# Patient Record
Sex: FEMALE | Race: WHITE | NOT HISPANIC OR LATINO | URBAN - METROPOLITAN AREA
[De-identification: names, ages, dates, MRNs, and addresses within clinical notes are randomized per-mention and may not be internally consistent; named-entity substitution may affect disease eponyms.]

---

## 2017-02-05 ENCOUNTER — EMERGENCY (EMERGENCY)
Facility: HOSPITAL | Age: 52
LOS: 1 days | Discharge: HOME | End: 2017-02-05
Attending: EMERGENCY MEDICINE | Admitting: EMERGENCY MEDICINE
Payer: COMMERCIAL

## 2017-02-05 VITALS
HEIGHT: 62 IN | RESPIRATION RATE: 18 BRPM | WEIGHT: 139.99 LBS | HEART RATE: 100 BPM | SYSTOLIC BLOOD PRESSURE: 134 MMHG | OXYGEN SATURATION: 99 % | DIASTOLIC BLOOD PRESSURE: 77 MMHG | TEMPERATURE: 99 F

## 2017-02-05 DIAGNOSIS — R51 HEADACHE: ICD-10-CM

## 2017-02-05 LAB
ALBUMIN SERPL ELPH-MCNC: 4.6 G/DL — SIGNIFICANT CHANGE UP (ref 3.3–5)
ALP SERPL-CCNC: 144 U/L — HIGH (ref 40–120)
ALT FLD-CCNC: 53 U/L RC — HIGH (ref 10–45)
ANION GAP SERPL CALC-SCNC: 13 MMOL/L — SIGNIFICANT CHANGE UP (ref 5–17)
APPEARANCE UR: CLEAR — SIGNIFICANT CHANGE UP
AST SERPL-CCNC: 32 U/L — SIGNIFICANT CHANGE UP (ref 10–40)
BACTERIA # UR AUTO: ABNORMAL /HPF
BASOPHILS # BLD AUTO: 0 K/UL — SIGNIFICANT CHANGE UP (ref 0–0.2)
BASOPHILS NFR BLD AUTO: 0.6 % — SIGNIFICANT CHANGE UP (ref 0–2)
BILIRUB SERPL-MCNC: 0.6 MG/DL — SIGNIFICANT CHANGE UP (ref 0.2–1.2)
BILIRUB UR-MCNC: NEGATIVE — SIGNIFICANT CHANGE UP
BUN SERPL-MCNC: 13 MG/DL — SIGNIFICANT CHANGE UP (ref 7–23)
CALCIUM SERPL-MCNC: 10.1 MG/DL — SIGNIFICANT CHANGE UP (ref 8.4–10.5)
CHLORIDE SERPL-SCNC: 103 MMOL/L — SIGNIFICANT CHANGE UP (ref 96–108)
CO2 SERPL-SCNC: 26 MMOL/L — SIGNIFICANT CHANGE UP (ref 22–31)
COLOR SPEC: SIGNIFICANT CHANGE UP
CREAT SERPL-MCNC: 0.82 MG/DL — SIGNIFICANT CHANGE UP (ref 0.5–1.3)
DIFF PNL FLD: NEGATIVE — SIGNIFICANT CHANGE UP
EOSINOPHIL # BLD AUTO: 0 K/UL — SIGNIFICANT CHANGE UP (ref 0–0.5)
EOSINOPHIL NFR BLD AUTO: 0.6 % — SIGNIFICANT CHANGE UP (ref 0–6)
EPI CELLS # UR: SIGNIFICANT CHANGE UP /HPF
GLUCOSE SERPL-MCNC: 103 MG/DL — HIGH (ref 70–99)
GLUCOSE UR QL: NEGATIVE — SIGNIFICANT CHANGE UP
HCT VFR BLD CALC: 41.4 % — SIGNIFICANT CHANGE UP (ref 34.5–45)
HGB BLD-MCNC: 14 G/DL — SIGNIFICANT CHANGE UP (ref 11.5–15.5)
KETONES UR-MCNC: NEGATIVE — SIGNIFICANT CHANGE UP
LEUKOCYTE ESTERASE UR-ACNC: NEGATIVE — SIGNIFICANT CHANGE UP
LYMPHOCYTES # BLD AUTO: 1.5 K/UL — SIGNIFICANT CHANGE UP (ref 1–3.3)
LYMPHOCYTES # BLD AUTO: 22 % — SIGNIFICANT CHANGE UP (ref 13–44)
MCHC RBC-ENTMCNC: 31.8 PG — SIGNIFICANT CHANGE UP (ref 27–34)
MCHC RBC-ENTMCNC: 33.9 GM/DL — SIGNIFICANT CHANGE UP (ref 32–36)
MCV RBC AUTO: 94 FL — SIGNIFICANT CHANGE UP (ref 80–100)
MONOCYTES # BLD AUTO: 0.5 K/UL — SIGNIFICANT CHANGE UP (ref 0–0.9)
MONOCYTES NFR BLD AUTO: 6.7 % — SIGNIFICANT CHANGE UP (ref 2–14)
NEUTROPHILS # BLD AUTO: 4.8 K/UL — SIGNIFICANT CHANGE UP (ref 1.8–7.4)
NEUTROPHILS NFR BLD AUTO: 70.2 % — SIGNIFICANT CHANGE UP (ref 43–77)
NITRITE UR-MCNC: NEGATIVE — SIGNIFICANT CHANGE UP
PH UR: 5.5 — SIGNIFICANT CHANGE UP (ref 4.8–8)
PLATELET # BLD AUTO: 214 K/UL — SIGNIFICANT CHANGE UP (ref 150–400)
POTASSIUM SERPL-MCNC: 3.9 MMOL/L — SIGNIFICANT CHANGE UP (ref 3.5–5.3)
POTASSIUM SERPL-SCNC: 3.9 MMOL/L — SIGNIFICANT CHANGE UP (ref 3.5–5.3)
PROT SERPL-MCNC: 7.6 G/DL — SIGNIFICANT CHANGE UP (ref 6–8.3)
PROT UR-MCNC: NEGATIVE — SIGNIFICANT CHANGE UP
RBC # BLD: 4.41 M/UL — SIGNIFICANT CHANGE UP (ref 3.8–5.2)
RBC # FLD: 10.9 % — SIGNIFICANT CHANGE UP (ref 10.3–14.5)
RBC CASTS # UR COMP ASSIST: SIGNIFICANT CHANGE UP /HPF (ref 0–2)
SODIUM SERPL-SCNC: 142 MMOL/L — SIGNIFICANT CHANGE UP (ref 135–145)
SP GR SPEC: 1.01 — LOW (ref 1.01–1.02)
UROBILINOGEN FLD QL: NEGATIVE — SIGNIFICANT CHANGE UP
WBC # BLD: 6.8 K/UL — SIGNIFICANT CHANGE UP (ref 3.8–10.5)
WBC # FLD AUTO: 6.8 K/UL — SIGNIFICANT CHANGE UP (ref 3.8–10.5)
WBC UR QL: SIGNIFICANT CHANGE UP /HPF (ref 0–5)

## 2017-02-05 PROCEDURE — 70450 CT HEAD/BRAIN W/O DYE: CPT | Mod: 26

## 2017-02-05 PROCEDURE — 99285 EMERGENCY DEPT VISIT HI MDM: CPT

## 2017-02-05 PROCEDURE — 96375 TX/PRO/DX INJ NEW DRUG ADDON: CPT

## 2017-02-05 PROCEDURE — 99284 EMERGENCY DEPT VISIT MOD MDM: CPT | Mod: 25

## 2017-02-05 PROCEDURE — 85652 RBC SED RATE AUTOMATED: CPT

## 2017-02-05 PROCEDURE — 81001 URINALYSIS AUTO W/SCOPE: CPT

## 2017-02-05 PROCEDURE — 80053 COMPREHEN METABOLIC PANEL: CPT

## 2017-02-05 PROCEDURE — 85027 COMPLETE CBC AUTOMATED: CPT

## 2017-02-05 PROCEDURE — 70450 CT HEAD/BRAIN W/O DYE: CPT

## 2017-02-05 PROCEDURE — 96374 THER/PROPH/DIAG INJ IV PUSH: CPT

## 2017-02-05 RX ORDER — DIVALPROEX SODIUM 500 MG/1
250 TABLET, DELAYED RELEASE ORAL ONCE
Qty: 0 | Refills: 0 | Status: DISCONTINUED | OUTPATIENT
Start: 2017-02-05 | End: 2017-02-05

## 2017-02-05 RX ORDER — DIVALPROEX SODIUM 500 MG/1
250 TABLET, DELAYED RELEASE ORAL ONCE
Qty: 0 | Refills: 0 | Status: COMPLETED | OUTPATIENT
Start: 2017-02-05 | End: 2017-02-05

## 2017-02-05 RX ORDER — SODIUM CHLORIDE 9 MG/ML
1000 INJECTION INTRAMUSCULAR; INTRAVENOUS; SUBCUTANEOUS ONCE
Qty: 0 | Refills: 0 | Status: COMPLETED | OUTPATIENT
Start: 2017-02-05 | End: 2017-02-05

## 2017-02-05 RX ORDER — METOCLOPRAMIDE HCL 10 MG
10 TABLET ORAL ONCE
Qty: 0 | Refills: 0 | Status: COMPLETED | OUTPATIENT
Start: 2017-02-05 | End: 2017-02-05

## 2017-02-05 RX ORDER — ACETAMINOPHEN 500 MG
1000 TABLET ORAL ONCE
Qty: 0 | Refills: 0 | Status: COMPLETED | OUTPATIENT
Start: 2017-02-05 | End: 2017-02-05

## 2017-02-05 RX ORDER — DIAZEPAM 5 MG
5 TABLET ORAL ONCE
Qty: 0 | Refills: 0 | Status: DISCONTINUED | OUTPATIENT
Start: 2017-02-05 | End: 2017-02-05

## 2017-02-05 RX ADMIN — SODIUM CHLORIDE 1000 MILLILITER(S): 9 INJECTION INTRAMUSCULAR; INTRAVENOUS; SUBCUTANEOUS at 19:38

## 2017-02-05 RX ADMIN — DIVALPROEX SODIUM 250 MILLIGRAM(S): 500 TABLET, DELAYED RELEASE ORAL at 22:43

## 2017-02-05 RX ADMIN — Medication 10 MILLIGRAM(S): at 19:37

## 2017-02-05 RX ADMIN — Medication 5 MILLIGRAM(S): at 22:16

## 2017-02-05 RX ADMIN — SODIUM CHLORIDE 1000 MILLILITER(S): 9 INJECTION INTRAMUSCULAR; INTRAVENOUS; SUBCUTANEOUS at 22:17

## 2017-02-05 RX ADMIN — Medication 400 MILLIGRAM(S): at 19:38

## 2017-02-05 RX ADMIN — Medication 1000 MILLIGRAM(S): at 21:57

## 2017-02-05 NOTE — ED ADULT NURSE NOTE - OBJECTIVE STATEMENT
This 51 female in ED with c/o has had right temporal pressure X1 week, waxing and waning but became worse than ever today. Pt denies visual changes, photophobia, numbness or tingling or nausea or vomiting. Pt has been taking Tylenol without relief.

## 2017-02-05 NOTE — ED PROVIDER NOTE - OBJECTIVE STATEMENT
51 y.o. female no PMHx p/w headache. Patient describes symptoms as not painful but more of pressure like sensation, located focally over the right temple. Symptoms have been present for 1 week, came on gradually, constant and has no alleviated. She has tried taking Motrin and Tylenol but they provided minimal symptomatic relief. She does report that applying pressure to the area seems to alleviate pressure somewhat. Denies fevers, chills, vision changes, weakness, numbness/tingling.

## 2017-02-05 NOTE — ED PROVIDER NOTE - ATTENDING CONTRIBUTION TO CARE
52 y/o f with pmhx denies, perimenopausal presents for head pressure on right side temporal area that began yesterday, worse today. no associated weakness ./ numbness. no change in mentation. no fever no chills. no change in vision no change in hearing. no incontinence of urine or bowel. no back pain or neck pain. no abd pain no chest pain no heart palp. first time with headache. gradual onset. describes as pressure and not a sharp pain 5/10.   Gen. no acute distress, Non toxic   HEENT: EOMI, mmm, no tenderness over temporal artery. supple neck. full range of motion. no murmur of neck  Lungs: CTAB/L no C/ W /R   CVS: S1S2   Abd; Soft non tender, non distended   Ext: no edema   Neuro AAOx3 non focal clear speech,  neg Burdinksi / Kernig, steady gait. 50 y/o f with pmhx denies, perimenopausal presents for head pressure on right side temporal area that began yesterday, worse today. no associated weakness ./ numbness. no change in mentation. no fever no chills. no change in vision no change in hearing. no incontinence of urine or bowel. no back pain or neck pain. no abd pain no chest pain no heart palp. first time with headache. gradual onset. describes as pressure and not a sharp pain 5/10.   Gen. no acute distress, Non toxic   HEENT: EOMI, mmm, no tenderness over temporal artery. supple neck. full range of motion. no murmur of neck pupils 3 mm b/l equally round and reactive to light. no pain with eye movement. no skin changes. no distended veins.   Lungs: CTAB/L no C/ W /R   CVS: S1S2   Abd; Soft non tender, non distended   Ext: no edema   Neuro AAOx3 non focal clear speech,  neg Burdinksi / Kernig, steady gait.  muscle strength 5/5 x 4 extremities. no sens deficits. no pronator drift.

## 2017-02-05 NOTE — ED PROVIDER NOTE - PROGRESS NOTE DETAILS
Patient reports minimal improvement in symptoms after IV tylenol and Reglan. Will give Valium PO and reassess. Arvind Jenkins PA-C. ATTD: Dr. Boyer - patient feels much better. wants to go home.refusing steroids. was evaluated by neuro. no further imaging rec at this time. dc home to follow with Dr. Calero and neuro as outpt. return if worsens or persists.

## 2017-02-06 VITALS
HEART RATE: 87 BPM | OXYGEN SATURATION: 99 % | DIASTOLIC BLOOD PRESSURE: 77 MMHG | SYSTOLIC BLOOD PRESSURE: 130 MMHG | RESPIRATION RATE: 16 BRPM

## 2018-02-21 ENCOUNTER — INPATIENT (INPATIENT)
Facility: HOSPITAL | Age: 53
LOS: 1 days | Discharge: HOME | DRG: 204 | End: 2018-02-23
Attending: STUDENT IN AN ORGANIZED HEALTH CARE EDUCATION/TRAINING PROGRAM | Admitting: STUDENT IN AN ORGANIZED HEALTH CARE EDUCATION/TRAINING PROGRAM
Payer: COMMERCIAL

## 2018-02-21 VITALS
WEIGHT: 141.98 LBS | OXYGEN SATURATION: 97 % | RESPIRATION RATE: 18 BRPM | DIASTOLIC BLOOD PRESSURE: 90 MMHG | HEIGHT: 62 IN | TEMPERATURE: 99 F | HEART RATE: 114 BPM | SYSTOLIC BLOOD PRESSURE: 136 MMHG

## 2018-02-21 DIAGNOSIS — Z29.9 ENCOUNTER FOR PROPHYLACTIC MEASURES, UNSPECIFIED: ICD-10-CM

## 2018-02-21 DIAGNOSIS — Z98.890 OTHER SPECIFIED POSTPROCEDURAL STATES: Chronic | ICD-10-CM

## 2018-02-21 DIAGNOSIS — R06.09 OTHER FORMS OF DYSPNEA: ICD-10-CM

## 2018-02-21 DIAGNOSIS — E78.5 HYPERLIPIDEMIA, UNSPECIFIED: ICD-10-CM

## 2018-02-21 DIAGNOSIS — F41.9 ANXIETY DISORDER, UNSPECIFIED: ICD-10-CM

## 2018-02-21 DIAGNOSIS — E04.1 NONTOXIC SINGLE THYROID NODULE: ICD-10-CM

## 2018-02-21 LAB
ALBUMIN SERPL ELPH-MCNC: 4.8 G/DL — SIGNIFICANT CHANGE UP (ref 3.3–5)
ALP SERPL-CCNC: 129 U/L — HIGH (ref 40–120)
ALT FLD-CCNC: 27 U/L RC — SIGNIFICANT CHANGE UP (ref 10–45)
ANION GAP SERPL CALC-SCNC: 12 MMOL/L — SIGNIFICANT CHANGE UP (ref 5–17)
APPEARANCE UR: ABNORMAL
APTT BLD: 28.4 SEC — SIGNIFICANT CHANGE UP (ref 27.5–37.4)
AST SERPL-CCNC: 19 U/L — SIGNIFICANT CHANGE UP (ref 10–40)
BASOPHILS # BLD AUTO: 0 K/UL — SIGNIFICANT CHANGE UP (ref 0–0.2)
BASOPHILS NFR BLD AUTO: 0.7 % — SIGNIFICANT CHANGE UP (ref 0–2)
BILIRUB SERPL-MCNC: 1.1 MG/DL — SIGNIFICANT CHANGE UP (ref 0.2–1.2)
BILIRUB UR-MCNC: NEGATIVE — SIGNIFICANT CHANGE UP
BUN SERPL-MCNC: 16 MG/DL — SIGNIFICANT CHANGE UP (ref 7–23)
CALCIUM SERPL-MCNC: 10 MG/DL — SIGNIFICANT CHANGE UP (ref 8.4–10.5)
CHLORIDE SERPL-SCNC: 103 MMOL/L — SIGNIFICANT CHANGE UP (ref 96–108)
CK MB CFR SERPL CALC: 1.4 NG/ML — SIGNIFICANT CHANGE UP (ref 0–3.8)
CO2 SERPL-SCNC: 27 MMOL/L — SIGNIFICANT CHANGE UP (ref 22–31)
COLOR SPEC: YELLOW — SIGNIFICANT CHANGE UP
CREAT SERPL-MCNC: 0.91 MG/DL — SIGNIFICANT CHANGE UP (ref 0.5–1.3)
D DIMER BLD IA.RAPID-MCNC: <150 NG/ML DDU — SIGNIFICANT CHANGE UP
DIFF PNL FLD: NEGATIVE — SIGNIFICANT CHANGE UP
EOSINOPHIL # BLD AUTO: 0 K/UL — SIGNIFICANT CHANGE UP (ref 0–0.5)
EOSINOPHIL NFR BLD AUTO: 0.4 % — SIGNIFICANT CHANGE UP (ref 0–6)
GAS PNL BLDV: SIGNIFICANT CHANGE UP
GLUCOSE SERPL-MCNC: 102 MG/DL — HIGH (ref 70–99)
GLUCOSE UR QL: NEGATIVE — SIGNIFICANT CHANGE UP
HCG SERPL-ACNC: 2.8 MIU/ML — LOW (ref 5–24)
HCT VFR BLD CALC: 45.6 % — HIGH (ref 34.5–45)
HGB BLD-MCNC: 15.1 G/DL — SIGNIFICANT CHANGE UP (ref 11.5–15.5)
INR BLD: 1.07 RATIO — SIGNIFICANT CHANGE UP (ref 0.88–1.16)
KETONES UR-MCNC: NEGATIVE — SIGNIFICANT CHANGE UP
LEUKOCYTE ESTERASE UR-ACNC: ABNORMAL
LYMPHOCYTES # BLD AUTO: 1.3 K/UL — SIGNIFICANT CHANGE UP (ref 1–3.3)
LYMPHOCYTES # BLD AUTO: 20.9 % — SIGNIFICANT CHANGE UP (ref 13–44)
MCHC RBC-ENTMCNC: 31.4 PG — SIGNIFICANT CHANGE UP (ref 27–34)
MCHC RBC-ENTMCNC: 33.2 GM/DL — SIGNIFICANT CHANGE UP (ref 32–36)
MCV RBC AUTO: 94.4 FL — SIGNIFICANT CHANGE UP (ref 80–100)
MONOCYTES # BLD AUTO: 0.4 K/UL — SIGNIFICANT CHANGE UP (ref 0–0.9)
MONOCYTES NFR BLD AUTO: 6.1 % — SIGNIFICANT CHANGE UP (ref 2–14)
NEUTROPHILS # BLD AUTO: 4.5 K/UL — SIGNIFICANT CHANGE UP (ref 1.8–7.4)
NEUTROPHILS NFR BLD AUTO: 72 % — SIGNIFICANT CHANGE UP (ref 43–77)
NITRITE UR-MCNC: NEGATIVE — SIGNIFICANT CHANGE UP
PH UR: 5.5 — SIGNIFICANT CHANGE UP (ref 5–8)
PLATELET # BLD AUTO: 209 K/UL — SIGNIFICANT CHANGE UP (ref 150–400)
POTASSIUM SERPL-MCNC: 3.9 MMOL/L — SIGNIFICANT CHANGE UP (ref 3.5–5.3)
POTASSIUM SERPL-SCNC: 3.9 MMOL/L — SIGNIFICANT CHANGE UP (ref 3.5–5.3)
PROT SERPL-MCNC: 8 G/DL — SIGNIFICANT CHANGE UP (ref 6–8.3)
PROT UR-MCNC: NEGATIVE — SIGNIFICANT CHANGE UP
PROTHROM AB SERPL-ACNC: 11.6 SEC — SIGNIFICANT CHANGE UP (ref 9.8–12.7)
RBC # BLD: 4.83 M/UL — SIGNIFICANT CHANGE UP (ref 3.8–5.2)
RBC # FLD: 10.8 % — SIGNIFICANT CHANGE UP (ref 10.3–14.5)
SODIUM SERPL-SCNC: 142 MMOL/L — SIGNIFICANT CHANGE UP (ref 135–145)
SP GR SPEC: 1.01 — SIGNIFICANT CHANGE UP (ref 1.01–1.02)
TROPONIN T SERPL-MCNC: <0.01 NG/ML — SIGNIFICANT CHANGE UP (ref 0–0.06)
TROPONIN T SERPL-MCNC: <0.01 NG/ML — SIGNIFICANT CHANGE UP (ref 0–0.06)
TSH SERPL-MCNC: 0.52 UIU/ML — SIGNIFICANT CHANGE UP (ref 0.27–4.2)
UROBILINOGEN FLD QL: NEGATIVE — SIGNIFICANT CHANGE UP
WBC # BLD: 6.2 K/UL — SIGNIFICANT CHANGE UP (ref 3.8–10.5)
WBC # FLD AUTO: 6.2 K/UL — SIGNIFICANT CHANGE UP (ref 3.8–10.5)

## 2018-02-21 PROCEDURE — 93010 ELECTROCARDIOGRAM REPORT: CPT

## 2018-02-21 PROCEDURE — 99223 1ST HOSP IP/OBS HIGH 75: CPT

## 2018-02-21 PROCEDURE — 71046 X-RAY EXAM CHEST 2 VIEWS: CPT | Mod: 26

## 2018-02-21 PROCEDURE — 99285 EMERGENCY DEPT VISIT HI MDM: CPT | Mod: 25

## 2018-02-21 PROCEDURE — 71275 CT ANGIOGRAPHY CHEST: CPT | Mod: 26

## 2018-02-21 RX ORDER — ENOXAPARIN SODIUM 100 MG/ML
40 INJECTION SUBCUTANEOUS EVERY 24 HOURS
Qty: 0 | Refills: 0 | Status: DISCONTINUED | OUTPATIENT
Start: 2018-02-21 | End: 2018-02-23

## 2018-02-21 RX ORDER — HYDROCORTISONE 20 MG
100 TABLET ORAL ONCE
Qty: 0 | Refills: 0 | Status: COMPLETED | OUTPATIENT
Start: 2018-02-21 | End: 2018-02-21

## 2018-02-21 RX ORDER — ASPIRIN/CALCIUM CARB/MAGNESIUM 324 MG
324 TABLET ORAL ONCE
Qty: 0 | Refills: 0 | Status: COMPLETED | OUTPATIENT
Start: 2018-02-21 | End: 2018-02-21

## 2018-02-21 RX ORDER — DIPHENHYDRAMINE HCL 50 MG
25 CAPSULE ORAL ONCE
Qty: 0 | Refills: 0 | Status: COMPLETED | OUTPATIENT
Start: 2018-02-21 | End: 2018-02-21

## 2018-02-21 RX ADMIN — Medication 324 MILLIGRAM(S): at 14:54

## 2018-02-21 RX ADMIN — Medication 25 MILLIGRAM(S): at 17:38

## 2018-02-21 RX ADMIN — Medication 100 MILLIGRAM(S): at 14:55

## 2018-02-21 NOTE — H&P ADULT - NSHPREVIEWOFSYSTEMS_GEN_ALL_CORE
CONSTITUTIONAL: No weakness, fevers or chills  EYES/ENT: No visual changes;  No dysphagia  NECK: No pain or stiffness  RESPIRATORY: dry cough, no sputum; +IBRAHIM  CARDIOVASCULAR: occasional chest discomfort left rib area.  +palpitation  EXTREMITIES: no le edema, cyanosis, clubbing  MUSCULOSKELETAL: no joint pain, swelling  GASTROINTESTINAL: No abdominal or epigastric pain. No nausea, vomiting, or hematemesis; No diarrhea or constipation. No melena or hematochezia.  BACK: No back pain  GENITOURINARY: No dysuria, frequency or hematuria; has hot flushes  NEUROLOGICAL: occasional left arm tingling sensation  SKIN: No itching, burning, rashes, or lesions   PSYCH: hx of anxiety  All other review of systems is negative unless indicated above.

## 2018-02-21 NOTE — ED PROVIDER NOTE - MUSCULOSKELETAL NEGATIVE STATEMENT, MLM
no back pain, no gout, no musculoskeletal pain, no neck pain, and no weakness. No calf pain or leg swelling

## 2018-02-21 NOTE — H&P ADULT - NSHPPHYSICALEXAM_GEN_ALL_CORE
PHYSICAL EXAM:  Vital Signs Last 24 Hrs  T(C): 36.9 (02-21-18 @ 21:47)  T(F): 98.4 (02-21-18 @ 21:47), Max: 99.4 (02-21-18 @ 12:42)  HR: 112 (02-21-18 @ 21:47) (100 - 118)  BP: 124/85 (02-21-18 @ 21:47)  BP(mean): --  RR: 19 (02-21-18 @ 21:47) (18 - 20)  SpO2: 98% (02-21-18 @ 21:47) (96% - 98%)  Wt(kg): --    Constitutional: NAD, awake and alert  EYES: EOMI  ENT:  Normal Hearing, no tonsillar exudates   Neck: Soft and supple, No JVD  Lungs: Breath sounds are clear bilaterally, No wheezing, rales or rhonchi  Heart: S1 and S2, regular rate and rhythm, no Murmurs, gallops or rubs  Abdomen: Bowel Sounds present, soft, nontender, nondistended, no guarding, no rebound  Extremities: No cyanosis or clubbing; warm to touch  Vascular: 2+ peripheral pulses lower ex  Neurological: A/O x 3, no focal deficits  Musculoskeletal: 5/5 strength b/l upper and lower extremities  Skin: No rashes  Psych: no depression or anhedonia  HEME: no bruises, no nose bleeds

## 2018-02-21 NOTE — H&P ADULT - ASSESSMENT
51 yo female wtih PMH of HLD, thyroid goiter, thyroid nodule, anxiety, not on medication, presents here with SOB.

## 2018-02-21 NOTE — H&P ADULT - HISTORY OF PRESENT ILLNESS
51 yo female wt PMH of HLD, thyroid goiter, thyroid nodule, anxiety, not on medication, presents here with SOB.  Patient states that for last 1.5 weeks she has been having SOB.  She feels that symptoms only occurs with activities, especially with walking.  She can walk from one room to another without any symptoms, but can't complete on block without having dyspnea.  Patient has occasional left rib pain, that worsens when lying on left side.  She also notices that her heart races at times.  Denies LE edema, orthopnea, PND.  She uses 3 pillows at night because otherwise nose gets stuffy.  Denies any dizziness, blurry vision.  She has dry cough, no sputum, no fever, no runny nose.  She denies any recent travel or sick contact.  She went to her PMD today, had EKG done which showed increased heart rate and therefore sent here for suspicion for PE.      Of note, both patient and her  states that she has been under lots of stress at home due to her teenage daughter.  Patient has hx of anxiety, was on lexapro in the past, but not taking medication for last few years.

## 2018-02-21 NOTE — ED PROVIDER NOTE - MEDICAL DECISION MAKING DETAILS
Attending MD Anne: 52F with no known pmh presenting with IBRAHIM x 1 week, tachy. PE a strong consideration especially in light of inferolateral TWI on EKG. Plan to proceed to CTA chest. DDx includes ACS, will obtain cardiac biomarkers, maintain on tele for now

## 2018-02-21 NOTE — ED PROVIDER NOTE - PROGRESS NOTE DETAILS
Jose Schultz MD: Patient admitted to [facility] status post negative PE study on CTa due to dyspnea on exertion and ekg changes

## 2018-02-21 NOTE — ED PROVIDER NOTE - OBJECTIVE STATEMENT
52 yr female with no pmhx, complains of  dyspnea on exertion for 1 week, does note a dry cough, with no leg swelling, no calf pain. History of PE or DVT . No recent travel or surgery was evaluated by patients PCP was tachy cardiac and was sent to rule out PE. 52 yr female with no pmhx, complains of  dyspnea on exertion for 1 week, does note a dry cough, with no leg swelling, no calf pain. History of PE or DVT . No recent travel or surgery was evaluated by patients PCP was tachy cardiac and was sent to rule out PE.       PMD Dr. Marcelina Gimenez

## 2018-02-21 NOTE — ED ADULT NURSE NOTE - OBJECTIVE STATEMENT
52yr male presented to the ED c/o sob.  Pt has no prior medical hx.  Pt reports sob for the past week and half, went to PMD today and was told to come to ED to r/u PE.  Pt presents a&ox4 reporting sob for the past week on exertion, reports feeling better when not walking and lyes down, denies chest pain, no n/v//d, no abdominal pain, no fever/chills/cough, lung sounds clear bilaterally, no dizziness or light headedness, skin warm dry & intact.

## 2018-02-21 NOTE — H&P ADULT - NSHPLABSRESULTS_GEN_ALL_CORE
Labs personally reviewed:                          15.1   6.2   )-----------( 209      ( 2018 14:15 )             45.6         142  |  103  |  16  ----------------------------<  102<H>  3.9   |  27  |  0.91    Ca    10.0      2018 14:15    TPro  8.0  /  Alb  4.8  /  TBili  1.1  /  DBili  x   /  AST  19  /  ALT  27  /  AlkPhos  129<H>  02-    CARDIAC MARKERS ( 2018 20:38 )  x     / <0.01 ng/mL / x     / x     / x      CARDIAC MARKERS ( 2018 14:15 )  x     / <0.01 ng/mL / x     / x     / 1.4 ng/mL      LIVER FUNCTIONS - ( 2018 14:15 )  Alb: 4.8 g/dL / Pro: 8.0 g/dL / ALK PHOS: 129 U/L / ALT: 27 U/L RC / AST: 19 U/L / GGT: x           PT/INR - ( 2018 14:15 )   PT: 11.6 sec;   INR: 1.07 ratio         PTT - ( 2018 14:15 )  PTT:28.4 sec  Urinalysis Basic - ( 2018 14:37 )    Color: Yellow / Appearance: SL Turbid / S.015 / pH: x  Gluc: x / Ketone: Negative  / Bili: Negative / Urobili: Negative   Blood: x / Protein: Negative / Nitrite: Negative   Leuk Esterase: Small / RBC: 3-5 /HPF / WBC 3-5 /HPF   Sq Epi: x / Non Sq Epi: OCC /HPF / Bacteria: Few /HPF      CAPILLARY BLOOD GLUCOSE          Imaging:  CXR personally reviewed: no focal opacity  CTA chest: no pulmonary embolism; evaluation of segmental and subsegmental branches are limited    EKG personally reviewed: nsr, TWI leads II, III, AVF Labs personally reviewed:                          15.1   6.2   )-----------( 209      ( 2018 14:15 )             45.6         142  |  103  |  16  ----------------------------<  102<H>  3.9   |  27  |  0.91    Ca    10.0      2018 14:15    TPro  8.0  /  Alb  4.8  /  TBili  1.1  /  DBili  x   /  AST  19  /  ALT  27  /  AlkPhos  129<H>  -    CARDIAC MARKERS ( 2018 20:38 )  x     / <0.01 ng/mL / x     / x     / x      CARDIAC MARKERS ( 2018 14:15 )  x     / <0.01 ng/mL / x     / x     / 1.4 ng/mL      LIVER FUNCTIONS - ( 2018 14:15 )  Alb: 4.8 g/dL / Pro: 8.0 g/dL / ALK PHOS: 129 U/L / ALT: 27 U/L RC / AST: 19 U/L / GGT: x           PT/INR - ( 2018 14:15 )   PT: 11.6 sec;   INR: 1.07 ratio         PTT - ( 2018 14:15 )  PTT:28.4 sec  Urinalysis Basic - ( 2018 14:37 )    Color: Yellow / Appearance: SL Turbid / S.015 / pH: x  Gluc: x / Ketone: Negative  / Bili: Negative / Urobili: Negative   Blood: x / Protein: Negative / Nitrite: Negative   Leuk Esterase: Small / RBC: 3-5 /HPF / WBC 3-5 /HPF   Sq Epi: x / Non Sq Epi: OCC /HPF / Bacteria: Few /HPF      CAPILLARY BLOOD GLUCOSE        Tele:  sinus rhythm HR 90s - 140s  Imaging:  CXR personally reviewed: no focal opacity  CTA chest: no pulmonary embolism; evaluation of segmental and subsegmental branches are limited    EKG personally reviewed: nsr, TWI leads II, III, AVF

## 2018-02-21 NOTE — H&P ADULT - PROBLEM SELECTOR PLAN 1
Patient with dyspnea on exertion, no orthopnea, no PND, no LE edema  unlikely CHF   no PE on CTA chest and d-dimer <150  no cough, no fever or leukocytosis, CXR negative for opacity, unlikely to be pneumonia  troponin x 2 negative; unlikely to be ACS, however given EKG changes, patient will benefit from stress test; will defer to cardiology  for now will get echocardiogram and monitor in telemetry   ?possible anxiety related  ?thyroid dysfunction, hx of thyroid nodule  will check TSH, T4, T3

## 2018-02-21 NOTE — PROVIDER CONTACT NOTE (OTHER) - ASSESSMENT
Pt is sinus tach, -120s on telemetry. Pt has no C/O pain/discomfort. Pt states she feels palpitations in chest and is cardiac enzymes negative X2. Patient states she has a nodule on her thyroid and possible goiter. VS: ; /85. Pt ias afebrile.

## 2018-02-22 ENCOUNTER — TRANSCRIPTION ENCOUNTER (OUTPATIENT)
Age: 53
End: 2018-02-22

## 2018-02-22 DIAGNOSIS — R94.31 ABNORMAL ELECTROCARDIOGRAM [ECG] [EKG]: ICD-10-CM

## 2018-02-22 LAB
ALBUMIN SERPL ELPH-MCNC: 3.9 G/DL — SIGNIFICANT CHANGE UP (ref 3.3–5)
ALP SERPL-CCNC: 112 U/L — SIGNIFICANT CHANGE UP (ref 40–120)
ALT FLD-CCNC: 20 U/L — SIGNIFICANT CHANGE UP (ref 10–45)
ANION GAP SERPL CALC-SCNC: 14 MMOL/L — SIGNIFICANT CHANGE UP (ref 5–17)
AST SERPL-CCNC: 19 U/L — SIGNIFICANT CHANGE UP (ref 10–40)
BASOPHILS # BLD AUTO: 0.01 K/UL — SIGNIFICANT CHANGE UP (ref 0–0.2)
BASOPHILS NFR BLD AUTO: 0.1 % — SIGNIFICANT CHANGE UP (ref 0–2)
BILIRUB SERPL-MCNC: 0.8 MG/DL — SIGNIFICANT CHANGE UP (ref 0.2–1.2)
BUN SERPL-MCNC: 20 MG/DL — SIGNIFICANT CHANGE UP (ref 7–23)
CALCIUM SERPL-MCNC: 10 MG/DL — SIGNIFICANT CHANGE UP (ref 8.4–10.5)
CHLORIDE SERPL-SCNC: 102 MMOL/L — SIGNIFICANT CHANGE UP (ref 96–108)
CHOLEST SERPL-MCNC: 207 MG/DL — HIGH (ref 10–199)
CK MB CFR SERPL CALC: 1.3 NG/ML — SIGNIFICANT CHANGE UP (ref 0–3.8)
CK SERPL-CCNC: 46 U/L — SIGNIFICANT CHANGE UP (ref 25–170)
CO2 SERPL-SCNC: 25 MMOL/L — SIGNIFICANT CHANGE UP (ref 22–31)
CREAT SERPL-MCNC: 0.9 MG/DL — SIGNIFICANT CHANGE UP (ref 0.5–1.3)
EOSINOPHIL # BLD AUTO: 0.01 K/UL — SIGNIFICANT CHANGE UP (ref 0–0.5)
EOSINOPHIL NFR BLD AUTO: 0.1 % — SIGNIFICANT CHANGE UP (ref 0–6)
GLUCOSE SERPL-MCNC: 128 MG/DL — HIGH (ref 70–99)
HCT VFR BLD CALC: 40.4 % — SIGNIFICANT CHANGE UP (ref 34.5–45)
HDLC SERPL-MCNC: 61 MG/DL — SIGNIFICANT CHANGE UP (ref 40–125)
HGB BLD-MCNC: 13.5 G/DL — SIGNIFICANT CHANGE UP (ref 11.5–15.5)
IMM GRANULOCYTES NFR BLD AUTO: 0.1 % — SIGNIFICANT CHANGE UP (ref 0–1.5)
LIPID PNL WITH DIRECT LDL SERPL: 134 MG/DL — HIGH
LYMPHOCYTES # BLD AUTO: 2.09 K/UL — SIGNIFICANT CHANGE UP (ref 1–3.3)
LYMPHOCYTES # BLD AUTO: 22 % — SIGNIFICANT CHANGE UP (ref 13–44)
MAGNESIUM SERPL-MCNC: 2.3 MG/DL — SIGNIFICANT CHANGE UP (ref 1.6–2.6)
MCHC RBC-ENTMCNC: 31 PG — SIGNIFICANT CHANGE UP (ref 27–34)
MCHC RBC-ENTMCNC: 33.4 GM/DL — SIGNIFICANT CHANGE UP (ref 32–36)
MCV RBC AUTO: 92.7 FL — SIGNIFICANT CHANGE UP (ref 80–100)
MONOCYTES # BLD AUTO: 0.87 K/UL — SIGNIFICANT CHANGE UP (ref 0–0.9)
MONOCYTES NFR BLD AUTO: 9.2 % — SIGNIFICANT CHANGE UP (ref 2–14)
NEUTROPHILS # BLD AUTO: 6.5 K/UL — SIGNIFICANT CHANGE UP (ref 1.8–7.4)
NEUTROPHILS NFR BLD AUTO: 68.5 % — SIGNIFICANT CHANGE UP (ref 43–77)
NT-PROBNP SERPL-SCNC: 136 PG/ML — SIGNIFICANT CHANGE UP (ref 0–300)
PHOSPHATE SERPL-MCNC: 4.1 MG/DL — SIGNIFICANT CHANGE UP (ref 2.5–4.5)
PLATELET # BLD AUTO: 228 K/UL — SIGNIFICANT CHANGE UP (ref 150–400)
POTASSIUM SERPL-MCNC: 3.8 MMOL/L — SIGNIFICANT CHANGE UP (ref 3.5–5.3)
POTASSIUM SERPL-SCNC: 3.8 MMOL/L — SIGNIFICANT CHANGE UP (ref 3.5–5.3)
PROT SERPL-MCNC: 6.9 G/DL — SIGNIFICANT CHANGE UP (ref 6–8.3)
RBC # BLD: 4.36 M/UL — SIGNIFICANT CHANGE UP (ref 3.8–5.2)
RBC # FLD: 12.4 % — SIGNIFICANT CHANGE UP (ref 10.3–14.5)
SODIUM SERPL-SCNC: 141 MMOL/L — SIGNIFICANT CHANGE UP (ref 135–145)
T3 SERPL-MCNC: 72 NG/DL — LOW (ref 80–200)
T4 AB SER-ACNC: 7.5 UG/DL — SIGNIFICANT CHANGE UP (ref 4.6–12)
TOTAL CHOLESTEROL/HDL RATIO MEASUREMENT: 3.4 RATIO — SIGNIFICANT CHANGE UP (ref 3.3–7.1)
TRIGL SERPL-MCNC: 58 MG/DL — SIGNIFICANT CHANGE UP (ref 10–149)
TROPONIN T SERPL-MCNC: <0.01 NG/ML — SIGNIFICANT CHANGE UP (ref 0–0.06)
TSH SERPL-MCNC: 0.52 UIU/ML — SIGNIFICANT CHANGE UP (ref 0.27–4.2)
WBC # BLD: 9.49 K/UL — SIGNIFICANT CHANGE UP (ref 3.8–10.5)
WBC # FLD AUTO: 9.49 K/UL — SIGNIFICANT CHANGE UP (ref 3.8–10.5)

## 2018-02-22 PROCEDURE — 93306 TTE W/DOPPLER COMPLETE: CPT | Mod: 26

## 2018-02-22 RX ORDER — PREGABALIN 225 MG/1
0 CAPSULE ORAL
Qty: 0 | Refills: 0 | COMMUNITY

## 2018-02-22 RX ORDER — ALPRAZOLAM 0.25 MG
0.25 TABLET ORAL DAILY
Qty: 0 | Refills: 0 | Status: DISCONTINUED | OUTPATIENT
Start: 2018-02-22 | End: 2018-02-23

## 2018-02-22 NOTE — DISCHARGE NOTE ADULT - CARE PROVIDER_API CALL
Cheryle Gimenez), Internal Medicine  [address] [place] [address]  Phone: [phone]  Fax: [phone]    Raúl Fierro), Cardiovascular Disease; Internal Medicine  [address] [place] [address]  Phone: [phone]  Fax: [phone]

## 2018-02-22 NOTE — DISCHARGE NOTE ADULT - OTHER SIGNIFICANT FINDINGS
Attending DC note:  51 yo female wtih PMH of HLD, thyroid goiter, thyroid nodule, anxiety, not on medication, presents here with SOB. ACS ruled out. no events on tele. stress echo wnl. TTE wnl. symtoms resolved. patient discharged home.

## 2018-02-22 NOTE — CONSULT NOTE ADULT - ASSESSMENT
52 F h/o HLD, goiter, thyroid nodule, anxiety, a/w IBRAHIM. 52 F h/o HLD, goiter, thyroid nodule, anxiety, a/w IBRAHIM and abnormal EKG.

## 2018-02-22 NOTE — CONSULT NOTE ADULT - SUBJECTIVE AND OBJECTIVE BOX
[facility] Cardiology Consult  _________________________    CC: shortness of breath.    HPI:  52 F h/o HLD, goiter, thyroid nodule, anxiety, who was admitted with over 1 week of shortness of breath/ IBRAHIM with walking 1 block. She has occasional L rib pain, that worsens when lying on L side. No central chest pain or pressure.  + periodic heart racing but no dizziness/lightheadedness or syncope. She denies LE edema, orthopnea, or PND. She reports higher than normal levels of stress at home.     PCP Dr. Cheryle Gimenez.    PAST MEDICAL & SURGICAL HISTORY:  Anxiety  Thyroid nodule  Thyroid goiter  Hyperlipidemia  H/O umbilical hernia repair  Delivered by  section  H/O laparoscopy      MEDICATIONS  (STANDING):  enoxaparin Injectable 40 milliGRAM(s) SubCutaneous every 24 hours    MEDICATIONS  (PRN):  ALPRAZolam 0.25 milliGRAM(s) Oral daily PRN anxiety      Allergies    erythromycin (Unknown)  ibuprofen (Unknown)  iv dye (Hives; Urticaria)  penicillin (Unknown)    Intolerances        Social Histroy: Tobacco- , ETOH-, Illicit Drugs-    T(C): 36.8 (18 @ 04:44), Max: 37.4 (18 @ 12:42)  HR: 81 (18 @ 04:44) (81 - 118)  BP: 114/73 (18 @ 04:44) (110/66 - 136/90)  RR: 18 (18 @ 04:44) (18 - 20)  SpO2: 99% (18 @ 04:44) (96% - 99%)  I&O's Summary      Review of Systems:  Constitutional: [ ] Fever [ ] Chills [ ] Fatigue [ ] Weight change   HEENT: [ ] Blurred vision [ ] Eye Pain [ ] Headache [ ] Runny nose [ ] Sore Throat   Respiratory: [ ] Cough [ ] Wheezing [x ] Shortness of breath  Cardiovascular: [ ] Chest Pain [ ] Palpitations [x ] IBRAHIM [ ] PND [ ] Orthopnea  Gastrointestinal: [ ] Abdominal Pain [ ] Diarrhea [ ] Constipation [ ] Hemorrhoids [ ] Nausea [ ] Vomiting  Genitourinary: [ ] Nocturia [ ] Dysuria [ ] Incontinence  Extremities: [ ] Swelling [ ] Joint Pain  Neurologic: [ ] Focal deficit [ ] Paresthesias [ ] Syncope  Lymphatic: [ ] Swelling [ ] Lymphadenopathy   Skin: [ ] Rash [ ] Ecchymoses [ ] Wounds [ ] Lesions  Psychiatry: [ ] Depression [ ] Suicidal/Homicidal Ideation [ ] Anxiety [ ] Sleep Disturbances  [x ] 10 point review of systems is otherwise negative except as mentioned above            [ ]Unable to obtain    PHYSICAL EXAM:  GENERAL: Alert, NAD  NECK: Supple, No JVD, No carotid bruit.  CHEST/LUNG: Clear to auscultation bilaterally; No wheezes, rales, or rhonchi  HEART: S1 S2 normal, RRR,  No murmurs, rubs, or gallops  ABDOMEN: Soft, Nontender, Nondistended; Bowel sounds present  EXTREMITIES:  No LE edema.      LABS:                        15.1   6.2   )-----------( 209      ( 2018 14:15 )             45.6         142  |  103  |  16  ----------------------------<  102<H>  3.9   |  27  |  0.91    Ca    10.0      2018 14:15    TPro  8.0  /  Alb  4.8  /  TBili  1.1  /  DBili  x   /  AST  19  /  ALT  27  /  AlkPhos  129<H>  -    PT/INR - ( 2018 14:15 )   PT: 11.6 sec;   INR: 1.07 ratio         PTT - ( 2018 14:15 )  PTT:28.4 sec  CARDIAC MARKERS ( 2018 03:03 )  x     / <0.01 ng/mL / 46 U/L / x     / 1.3 ng/mL  CARDIAC MARKERS ( 2018 20:38 )  x     / <0.01 ng/mL / x     / x     / x      CARDIAC MARKERS ( 2018 14:15 )  x     / <0.01 ng/mL / x     / x     / 1.4 ng/mL      Urinalysis Basic - ( 2018 14:37 )    Color: Yellow / Appearance: SL Turbid / S.015 / pH: x  Gluc: x / Ketone: Negative  / Bili: Negative / Urobili: Negative   Blood: x / Protein: Negative / Nitrite: Negative   Leuk Esterase: Small / RBC: 3-5 /HPF / WBC 3-5 /HPF   Sq Epi: x / Non Sq Epi: OCC /HPF / Bacteria: Few /HPF        MEDICATIONS  (STANDING):  enoxaparin Injectable 40 milliGRAM(s) SubCutaneous every 24 hours    MEDICATIONS  (PRN):  ALPRAZolam 0.25 milliGRAM(s) Oral daily PRN anxiety      RADIOLOGY & ADDITIONAL TESTS:    Cardiology testing:  EKG:    Echo:  < from: Transthoracic Echocardiogram (16 @ 13:40) >  Patient name: Tiffany Almeida  YOB: 1965   Age: 50 (F)   MR#: [id number]  Study Date: 2016  Location: O/PSonographer: Edwina Becerra ARIAN  Study quality: Technically good  Referring Physician: CHERYLE GIMENEZ MD  Blood Pressure:130/76 mmHg  Height: 5ft 2in  Weight: 138 lb  BSA: 1.6 m2  ------------------------------------------------------------------------  PROCEDURE: Transthoracic echocardiogram with 2-D, M-Mode  and complete spectral and color flow Doppler.  INDICATION: Abnormal electrocardiogram (ECG) (EKG) (R94.31)  ------------------------------------------------------------------------  Dimensions:    Normal Values:  LA:     3.1    2.0 - 4.0 cm  Ao:     2.5    2.0 - 3.8 cm  SEPTUM: 0.7    0.6 - 1.2 cm  PWT:    1.0    0.6 - 1.1 cm  LVIDd:  4.7    3.0 - 5.6 cm  LVIDs:  3.2    1.8 - 4.0 cm  Derived variables:  LVMI: 81 g/m2  RWT: 0.42  Fractional short: 32 %  EF (Teicholtz): 60 %  ------------------------------------------------------------------------  Observations:  Mitral Valve: Normal mitral valve. Minimal mitral  regurgitation.  Aortic Valve/Aorta: Normal trileaflet aortic valve.  Aortic Root: 2.5 cm.  Left Atrium: LA volume index = 13 cc/m2.  Left Ventricle: Normal left ventricular systolic function.  No segmental wall motion abnormalities. Normal left  ventricular internal dimensions and wall thicknesses.  Right Heart: Normal right atrium. Normal right ventricular  size and function. Normal tricuspid valve. Normal pulmonic  valve.  Pericardium/Pleura: Normal pericardium with no pericardial  effusion.  Hemodynamic: Estimated right atrial pressure is 8 mm Hg.  ------------------------------------------------------------------------  Conclusions:  1. Normal left ventricular internal dimensions and wall  thicknesses.  2. Normal left ventricular systolic function. No segmental  wall motion abnormalities.  ------------------------------------------------------------------------  Confirmed on  2016 - 14:52:20 by Titus Rice M.D.  ------------------------------------------------------------------------    < end of copied text >    Telemetry: [facility] Cardiology Consult  _________________________    CC: shortness of breath.    HPI:  52 F h/o HLD, goiter, thyroid nodule, anxiety, who was admitted with over 1 week of shortness of breath/ IBRAHIM with walking 1 block. She has occasional L rib pain, that worsens when lying on L side. No central chest pain or pressure.  + periodic heart racing but no dizziness/lightheadedness or syncope. She denies LE edema, orthopnea, or PND. She reports higher than normal levels of stress at home.     PCP Dr. Cheryle Gimenez.    PAST MEDICAL & SURGICAL HISTORY:  Anxiety  Thyroid nodule  Thyroid goiter  Hyperlipidemia  H/O umbilical hernia repair  Delivered by  section  H/O laparoscopy      MEDICATIONS  (STANDING):  enoxaparin Injectable 40 milliGRAM(s) SubCutaneous every 24 hours    MEDICATIONS  (PRN):  ALPRAZolam 0.25 milliGRAM(s) Oral daily PRN anxiety      Allergies    erythromycin (Unknown)  ibuprofen (Unknown)  iv dye (Hives; Urticaria)  penicillin (Unknown)    Intolerances        Social Histroy: Tobacco- , ETOH-, Illicit Drugs-    T(C): 36.8 (18 @ 04:44), Max: 37.4 (18 @ 12:42)  HR: 81 (18 @ 04:44) (81 - 118)  BP: 114/73 (18 @ 04:44) (110/66 - 136/90)  RR: 18 (18 @ 04:44) (18 - 20)  SpO2: 99% (18 @ 04:44) (96% - 99%)  I&O's Summary      Review of Systems:  Constitutional: [ ] Fever [ ] Chills [ ] Fatigue [ ] Weight change   HEENT: [ ] Blurred vision [ ] Eye Pain [ ] Headache [ ] Runny nose [ ] Sore Throat   Respiratory: [ ] Cough [ ] Wheezing [x ] Shortness of breath  Cardiovascular: [ ] Chest Pain [ ] Palpitations [x ] IBRAHIM [ ] PND [ ] Orthopnea  Gastrointestinal: [ ] Abdominal Pain [ ] Diarrhea [ ] Constipation [ ] Hemorrhoids [ ] Nausea [ ] Vomiting  Genitourinary: [ ] Nocturia [ ] Dysuria [ ] Incontinence  Extremities: [ ] Swelling [ ] Joint Pain  Neurologic: [ ] Focal deficit [ ] Paresthesias [ ] Syncope  Lymphatic: [ ] Swelling [ ] Lymphadenopathy   Skin: [ ] Rash [ ] Ecchymoses [ ] Wounds [ ] Lesions  Psychiatry: [ ] Depression [ ] Suicidal/Homicidal Ideation [ ] Anxiety [ ] Sleep Disturbances  [x ] 10 point review of systems is otherwise negative except as mentioned above            [ ]Unable to obtain    PHYSICAL EXAM:  GENERAL: Alert, NAD  NECK: Supple, No JVD, No carotid bruit.  CHEST/LUNG: Clear to auscultation bilaterally; No wheezes, rales, or rhonchi  HEART: S1 S2 normal, RRR,  No murmurs, rubs, or gallops  ABDOMEN: Soft, Nontender, Nondistended; Bowel sounds present  EXTREMITIES:  No LE edema.      LABS:                        15.1   6.2   )-----------( 209      ( 2018 14:15 )             45.6         142  |  103  |  16  ----------------------------<  102<H>  3.9   |  27  |  0.91    Ca    10.0      2018 14:15    TPro  8.0  /  Alb  4.8  /  TBili  1.1  /  DBili  x   /  AST  19  /  ALT  27  /  AlkPhos  129<H>  -    PT/INR - ( 2018 14:15 )   PT: 11.6 sec;   INR: 1.07 ratio         PTT - ( 2018 14:15 )  PTT:28.4 sec  CARDIAC MARKERS ( 2018 03:03 )  x     / <0.01 ng/mL / 46 U/L / x     / 1.3 ng/mL  CARDIAC MARKERS ( 2018 20:38 )  x     / <0.01 ng/mL / x     / x     / x      CARDIAC MARKERS ( 2018 14:15 )  x     / <0.01 ng/mL / x     / x     / 1.4 ng/mL      Urinalysis Basic - ( 2018 14:37 )    Color: Yellow / Appearance: SL Turbid / S.015 / pH: x  Gluc: x / Ketone: Negative  / Bili: Negative / Urobili: Negative   Blood: x / Protein: Negative / Nitrite: Negative   Leuk Esterase: Small / RBC: 3-5 /HPF / WBC 3-5 /HPF   Sq Epi: x / Non Sq Epi: OCC /HPF / Bacteria: Few /HPF        MEDICATIONS  (STANDING):  enoxaparin Injectable 40 milliGRAM(s) SubCutaneous every 24 hours    MEDICATIONS  (PRN):  ALPRAZolam 0.25 milliGRAM(s) Oral daily PRN anxiety      RADIOLOGY & ADDITIONAL TESTS:    Cardiology testing:  EKG: sinus tach, LAD, diffuse nonspecific T wave abnormality.    Echo:  < from: Transthoracic Echocardiogram (16 @ 13:40) >  Patient name: Tiffany Almeida  YOB: 1965   Age: 50 (F)   MR#: [id number]  Study Date: 2016  Location: O/PSonographer: Edwina Becerra [facility]  Study quality: Technically good  Referring Physician: CHERYLE GIMENEZ MD  Blood Pressure:130/76 mmHg  Height: 5ft 2in  Weight: 138 lb  BSA: 1.6 m2  ------------------------------------------------------------------------  PROCEDURE: Transthoracic echocardiogram with 2-D, M-Mode  and complete spectral and color flow Doppler.  INDICATION: Abnormal electrocardiogram (ECG) (EKG) (R94.31)  ------------------------------------------------------------------------  Dimensions:    Normal Values:  LA:     3.1    2.0 - 4.0 cm  Ao:     2.5    2.0 - 3.8 cm  SEPTUM: 0.7    0.6 - 1.2 cm  PWT:    1.0    0.6 - 1.1 cm  LVIDd:  4.7    3.0 - 5.6 cm  LVIDs:  3.2    1.8 - 4.0 cm  Derived variables:  LVMI: 81 g/m2  RWT: 0.42  Fractional short: 32 %  EF (Christiane): 60 %  ------------------------------------------------------------------------  Observations:  Mitral Valve: Normal mitral valve. Minimal mitral  regurgitation.  Aortic Valve/Aorta: Normal trileaflet aortic valve.  Aortic Root: 2.5 cm.  Left Atrium: LA volume index = 13 cc/m2.  Left Ventricle: Normal left ventricular systolic function.  No segmental wall motion abnormalities. Normal left  ventricular internal dimensions and wall thicknesses.  Right Heart: Normal right atrium. Normal right ventricular  size and function. Normal tricuspid valve. Normal pulmonic  valve.  Pericardium/Pleura: Normal pericardium with no pericardial  effusion.  Hemodynamic: Estimated right atrial pressure is 8 mm Hg.  ------------------------------------------------------------------------  Conclusions:  1. Normal left ventricular internal dimensions and wall  thicknesses.  2. Normal left ventricular systolic function. No segmental  wall motion abnormalities.  ------------------------------------------------------------------------  Confirmed on  2016 - 14:52:20 by Titus Rice M.D.  ------------------------------------------------------------------------    < end of copied text >    Telemetry: sinus/sinus tach 's. [facility] Cardiology Consult  _________________________    CC: shortness of breath.    HPI:  52 F h/o HLD, goiter, thyroid nodule, anxiety, who was admitted with over 1 week of shortness of breath/ IBRAHIM with walking 1 block. She has occasional L rib pain, that worsens when lying on L side. No central chest pain or pressure.  + periodic heart racing but no dizziness/lightheadedness or syncope. She denies LE edema, orthopnea, or PND. She reports higher than normal levels of stress at home. Also currently menopausal.    PCP Dr. Cheryle Gimenez.    PAST MEDICAL & SURGICAL HISTORY:  Anxiety  Thyroid nodule  Thyroid goiter  Hyperlipidemia  H/O umbilical hernia repair  Delivered by  section  H/O laparoscopy      MEDICATIONS  (STANDING):  enoxaparin Injectable 40 milliGRAM(s) SubCutaneous every 24 hours    MEDICATIONS  (PRN):  ALPRAZolam 0.25 milliGRAM(s) Oral daily PRN anxiety      Allergies    erythromycin (Unknown)  ibuprofen (Unknown)  iv dye (Hives; Urticaria)  penicillin (Unknown)    Intolerances        Social Histroy: Tobacco- , ETOH-, Illicit Drugs-    T(C): 36.8 (18 @ 04:44), Max: 37.4 (18 @ 12:42)  HR: 81 (18 @ 04:44) (81 - 118)  BP: 114/73 (18 @ 04:44) (110/66 - 136/90)  RR: 18 (18 @ 04:44) (18 - 20)  SpO2: 99% (18 @ 04:44) (96% - 99%)  I&O's Summary      Review of Systems:  Constitutional: [ ] Fever [ ] Chills [ ] Fatigue [ ] Weight change   HEENT: [ ] Blurred vision [ ] Eye Pain [ ] Headache [ ] Runny nose [ ] Sore Throat   Respiratory: [ ] Cough [ ] Wheezing [x ] Shortness of breath  Cardiovascular: [ ] Chest Pain [ ] Palpitations [x ] IBRAHIM [ ] PND [ ] Orthopnea  Gastrointestinal: [ ] Abdominal Pain [ ] Diarrhea [ ] Constipation [ ] Hemorrhoids [ ] Nausea [ ] Vomiting  Genitourinary: [ ] Nocturia [ ] Dysuria [ ] Incontinence  Extremities: [ ] Swelling [ ] Joint Pain  Neurologic: [ ] Focal deficit [ ] Paresthesias [ ] Syncope  Lymphatic: [ ] Swelling [ ] Lymphadenopathy   Skin: [ ] Rash [ ] Ecchymoses [ ] Wounds [ ] Lesions  Psychiatry: [ ] Depression [ ] Suicidal/Homicidal Ideation [ ] Anxiety [ ] Sleep Disturbances  [x ] 10 point review of systems is otherwise negative except as mentioned above            [ ]Unable to obtain    PHYSICAL EXAM:  GENERAL: Alert, NAD  NECK: Supple, No JVD, No carotid bruit.  CHEST/LUNG: Clear to auscultation bilaterally; No wheezes, rales, or rhonchi  HEART: S1 S2 normal, tachycardiac and regular,  No murmurs, rubs, or gallops  ABDOMEN: Soft, Nontender, Nondistended; Bowel sounds present  EXTREMITIES:  No LE edema.      LABS:                        15.1   6.2   )-----------( 209      ( 2018 14:15 )             45.6         142  |  103  |  16  ----------------------------<  102<H>  3.9   |  27  |  0.91    Ca    10.0      2018 14:15    TPro  8.0  /  Alb  4.8  /  TBili  1.1  /  DBili  x   /  AST  19  /  ALT  27  /  AlkPhos  129<H>  -    PT/INR - ( 2018 14:15 )   PT: 11.6 sec;   INR: 1.07 ratio         PTT - ( 2018 14:15 )  PTT:28.4 sec  CARDIAC MARKERS ( 2018 03:03 )  x     / <0.01 ng/mL / 46 U/L / x     / 1.3 ng/mL  CARDIAC MARKERS ( 2018 20:38 )  x     / <0.01 ng/mL / x     / x     / x      CARDIAC MARKERS ( 2018 14:15 )  x     / <0.01 ng/mL / x     / x     / 1.4 ng/mL      Urinalysis Basic - ( 2018 14:37 )    Color: Yellow / Appearance: SL Turbid / S.015 / pH: x  Gluc: x / Ketone: Negative  / Bili: Negative / Urobili: Negative   Blood: x / Protein: Negative / Nitrite: Negative   Leuk Esterase: Small / RBC: 3-5 /HPF / WBC 3-5 /HPF   Sq Epi: x / Non Sq Epi: OCC /HPF / Bacteria: Few /HPF        MEDICATIONS  (STANDING):  enoxaparin Injectable 40 milliGRAM(s) SubCutaneous every 24 hours    MEDICATIONS  (PRN):  ALPRAZolam 0.25 milliGRAM(s) Oral daily PRN anxiety      RADIOLOGY & ADDITIONAL TESTS:    Cardiology testing:  EKG: sinus tach, LAD, diffuse nonspecific T wave abnormality.    Echo:  < from: Transthoracic Echocardiogram (16 @ 13:40) >  Patient name: Tiffany Almeida  YOB: 1965   Age: 50 (F)   MR#: [id number]  Study Date: 2016  Location: O/PSonographer: Edwina Becerra [facility]  Study quality: Technically good  Referring Physician: CHERYLE GIMENEZ MD  Blood Pressure:130/76 mmHg  Height: 5ft 2in  Weight: 138 lb  BSA: 1.6 m2  ------------------------------------------------------------------------  PROCEDURE: Transthoracic echocardiogram with 2-D, M-Mode  and complete spectral and color flow Doppler.  INDICATION: Abnormal electrocardiogram (ECG) (EKG) (R94.31)  ------------------------------------------------------------------------  Dimensions:    Normal Values:  LA:     3.1    2.0 - 4.0 cm  Ao:     2.5    2.0 - 3.8 cm  SEPTUM: 0.7    0.6 - 1.2 cm  PWT:    1.0    0.6 - 1.1 cm  LVIDd:  4.7    3.0 - 5.6 cm  LVIDs:  3.2    1.8 - 4.0 cm  Derived variables:  LVMI: 81 g/m2  RWT: 0.42  Fractional short: 32 %  EF (Lisatz): 60 %  ------------------------------------------------------------------------  Observations:  Mitral Valve: Normal mitral valve. Minimal mitral  regurgitation.  Aortic Valve/Aorta: Normal trileaflet aortic valve.  Aortic Root: 2.5 cm.  Left Atrium: LA volume index = 13 cc/m2.  Left Ventricle: Normal left ventricular systolic function.  No segmental wall motion abnormalities. Normal left  ventricular internal dimensions and wall thicknesses.  Right Heart: Normal right atrium. Normal right ventricular  size and function. Normal tricuspid valve. Normal pulmonic  valve.  Pericardium/Pleura: Normal pericardium with no pericardial  effusion.  Hemodynamic: Estimated right atrial pressure is 8 mm Hg.  ------------------------------------------------------------------------  Conclusions:  1. Normal left ventricular internal dimensions and wall  thicknesses.  2. Normal left ventricular systolic function. No segmental  wall motion abnormalities.  ------------------------------------------------------------------------  Confirmed on  2016 - 14:52:20 by Titus Rice M.D.  ------------------------------------------------------------------------    < end of copied text >    Telemetry: sinus/sinus tach 's.

## 2018-02-22 NOTE — DISCHARGE NOTE ADULT - MEDICATION SUMMARY - MEDICATIONS TO TAKE
I will START or STAY ON the medications listed below when I get home from the hospital:    Vitamin D3 1000 intl units oral tablet  -- 1 tab(s) by mouth once a day  -- Indication: For Supplement

## 2018-02-22 NOTE — DISCHARGE NOTE ADULT - HOSPITAL COURSE
60 y/o female with past medical history of HLD, thyroid goiter, thyroid nodule, anxiety ( not on any medications)  presents to the hospital  with c/o increase SOB and rapid HR.   States symptoms occur only with activities and walking.  She also c/o left rib pain that worsens when lying on left side.  Patient seen by her PMD, EKG with increase HR , concern for PE and was sent to ER   CTA chest was negative for PE   Echocardiogram  Nuclear stress   Patient stable for discharge and to follow up with cardiology and her PMD 62 y/o female PMH HLD, thyroid goiter, thyroid nodule, anxiety ( not on any medications)  presents to the hospital  with c/o increase SOB and rapid HR.   States symptoms occur only with activities and walking.  She also c/o left rib pain that worsens when lying on left side.  Patient seen by her PMD, EKG with increase HR , concern for PE and was sent to ER. CTA chest was negative for PE  Echocardiogram shows EF 65%.  Stress echocardiogram with no inducible ischemia.  Strain imaging performed post recovery.  GLS -13.3% (reduced).  Cardiac MRI may assist in assessing for cardiomyopathy.  .....................................................  Patient stable for discharge home. Follow up with cardiology and her PMD 60 y/o female PMH HLD, thyroid goiter, thyroid nodule, anxiety ( not on any medications)  presents to the hospital  with c/o increase SOB and rapid HR.   States symptoms occur only with activities and walking.  She also c/o left rib pain that worsens when lying on left side.  Patient seen by her PMD, EKG with increase HR , concern for PE and was sent to ER. CTA chest was negative for PE  Echocardiogram shows EF 65%.  Stress echocardiogram with no inducible ischemia.  Patient stable for discharge home. Follow up with Dr Fierro in 2 weeks and her PMD.

## 2018-02-22 NOTE — DISCHARGE NOTE ADULT - CARE PLAN
Principal Discharge DX:	Dyspnea on exertion  Secondary Diagnosis:	Abnormal EKG Principal Discharge DX:	Dyspnea on exertion  Goal:	To remain symptom free  Assessment and plan of treatment:	Follow up with your PMD in one week. Call to make an appointment.   If you develop chest pain or shortness of breath, please go to your nearest emergency room and contact your physician.  Secondary Diagnosis:	Abnormal EKG  Assessment and plan of treatment:	Follow up with your cardiologist in one week, call to make an appointment.

## 2018-02-22 NOTE — DISCHARGE NOTE ADULT - ADDITIONAL INSTRUCTIONS
please call to schedule an appointment with your PMD within 1 week after discharge   please call to schedule an appointment with cardiology within 3-5 days after discharge

## 2018-02-22 NOTE — PROGRESS NOTE ADULT - SUBJECTIVE AND OBJECTIVE BOX
Patient is a 52y old  Female who presents with a chief complaint of shortness of breath (2018 23:26)      SUBJECTIVE / OVERNIGHT EVENTS:    Patient seen and examined. co chest fluttering, feels like heart skipping beat. co dyspnea for 2 weeks. denies family hx of heart dz. denies smoking.      Vital Signs Last 24 Hrs  T(C): 36.8 (2018 04:44), Max: 37.4 (2018 12:42)  T(F): 98.2 (2018 04:44), Max: 99.4 (2018 12:42)  HR: 81 (2018 04:44) (81 - 118)  BP: 114/73 (2018 04:44) (110/66 - 136/90)  BP(mean): --  RR: 18 (2018 04:44) (18 - 20)  SpO2: 99% (2018 04:44) (96% - 99%)  I&O's Summary    2018 07:01  -  2018 10:51  --------------------------------------------------------  IN: 250 mL / OUT: 0 mL / NET: 250 mL        PE:  GENERAL: NAD, AAOx3  HEAD:  Atraumatic, Normocephalic  EYES: EOMI, PERRLA, conjunctiva and sclera clear  NECK: Supple, No JVD  CHEST/LUNG: CTABL, No wheeze  HEART: Regular rate and rhythm; NTTP, no murmur  ABDOMEN: Soft, Nontender, Nondistended; Bowel sounds present  EXTREMITIES:  2+ Peripheral Pulses, No clubbing, cyanosis, or edema  SKIN: No rashes or lesions  NEURO: No focal deficits    LABS:                        13.5   9.49  )-----------( 228      ( 2018 07:41 )             40.4         141  |  102  |  20  ----------------------------<  128<H>  3.8   |  25  |  0.90    Ca    10.0      2018 07:41  Phos  4.1       Mg     2.3         TPro  6.9  /  Alb  3.9  /  TBili  0.8  /  DBili  x   /  AST  19  /  ALT  20  /  AlkPhos  112  -    PT/INR - ( 2018 14:15 )   PT: 11.6 sec;   INR: 1.07 ratio         PTT - ( 2018 14:15 )  PTT:28.4 sec  CAPILLARY BLOOD GLUCOSE        CARDIAC MARKERS ( 2018 03:03 )  x     / <0.01 ng/mL / 46 U/L / x     / 1.3 ng/mL  CARDIAC MARKERS ( 2018 20:38 )  x     / <0.01 ng/mL / x     / x     / x      CARDIAC MARKERS ( 2018 14:15 )  x     / <0.01 ng/mL / x     / x     / 1.4 ng/mL      Urinalysis Basic - ( 2018 14:37 )    Color: Yellow / Appearance: SL Turbid / S.015 / pH: x  Gluc: x / Ketone: Negative  / Bili: Negative / Urobili: Negative   Blood: x / Protein: Negative / Nitrite: Negative   Leuk Esterase: Small / RBC: 3-5 /HPF / WBC 3-5 /HPF   Sq Epi: x / Non Sq Epi: OCC /HPF / Bacteria: Few /HPF        RADIOLOGY & ADDITIONAL TESTS:    Imaging Personally Reviewed:  [x] YES  [ ] NO    Consultant(s) Notes Reviewed:  [x] YES  [ ] NO    MEDICATIONS  (STANDING):  enoxaparin Injectable 40 milliGRAM(s) SubCutaneous every 24 hours    MEDICATIONS  (PRN):  ALPRAZolam 0.25 milliGRAM(s) Oral daily PRN anxiety      Care Discussed with Consultants/Other Providers [x] YES  [ ] NO    HEALTH ISSUES - PROBLEM Dx:  Abnormal EKG: Abnormal EKG  Prophylactic measure: Prophylactic measure  Hyperlipidemia: Hyperlipidemia  Thyroid nodule: Thyroid nodule  Anxiety: Anxiety  Dyspnea on exertion: Dyspnea on exertion

## 2018-02-22 NOTE — DISCHARGE NOTE ADULT - PATIENT PORTAL LINK FT
You can access the [facility], offered by [facility], by registering with the following website: [email or web address]

## 2018-02-22 NOTE — CONSULT NOTE ADULT - PROBLEM SELECTOR RECOMMENDATION 2
-  - will plan for ischemic eval with exercise nuclear stress test.   - plan d/w pt. Questions answered.    Raúl Fierro M.D., [facility]  [phone]     A total of 80 minutes of face-to-face time was spent with the patient during this encounter -over half of that time was spent in counseling and coordination of care.

## 2018-02-22 NOTE — CONSULT NOTE ADULT - PROBLEM SELECTOR RECOMMENDATION 9
-  - No evidence of decompensated HF on exam.  - check echo to eval LV systolic function and valves.  - check pro-BNP  - D- Dimer was negative, CXR clear.

## 2018-02-22 NOTE — DISCHARGE NOTE ADULT - PLAN OF CARE
To remain symptom free Follow up with your PMD in one week. Call to make an appointment.   If you develop chest pain or shortness of breath, please go to your nearest emergency room and contact your physician. Follow up with your cardiologist in one week, call to make an appointment.

## 2018-02-23 VITALS
RESPIRATION RATE: 18 BRPM | TEMPERATURE: 98 F | SYSTOLIC BLOOD PRESSURE: 110 MMHG | HEART RATE: 80 BPM | OXYGEN SATURATION: 99 % | DIASTOLIC BLOOD PRESSURE: 88 MMHG

## 2018-02-23 PROCEDURE — 85730 THROMBOPLASTIN TIME PARTIAL: CPT

## 2018-02-23 PROCEDURE — 82947 ASSAY GLUCOSE BLOOD QUANT: CPT

## 2018-02-23 PROCEDURE — 93325 DOPPLER ECHO COLOR FLOW MAPG: CPT

## 2018-02-23 PROCEDURE — 84436 ASSAY OF TOTAL THYROXINE: CPT

## 2018-02-23 PROCEDURE — 83735 ASSAY OF MAGNESIUM: CPT

## 2018-02-23 PROCEDURE — 84100 ASSAY OF PHOSPHORUS: CPT

## 2018-02-23 PROCEDURE — 82435 ASSAY OF BLOOD CHLORIDE: CPT

## 2018-02-23 PROCEDURE — 82330 ASSAY OF CALCIUM: CPT

## 2018-02-23 PROCEDURE — 81001 URINALYSIS AUTO W/SCOPE: CPT

## 2018-02-23 PROCEDURE — 84702 CHORIONIC GONADOTROPIN TEST: CPT

## 2018-02-23 PROCEDURE — 80053 COMPREHEN METABOLIC PANEL: CPT

## 2018-02-23 PROCEDURE — 83880 ASSAY OF NATRIURETIC PEPTIDE: CPT

## 2018-02-23 PROCEDURE — 93320 DOPPLER ECHO COMPLETE: CPT | Mod: 26

## 2018-02-23 PROCEDURE — 80061 LIPID PANEL: CPT

## 2018-02-23 PROCEDURE — 96375 TX/PRO/DX INJ NEW DRUG ADDON: CPT | Mod: XU

## 2018-02-23 PROCEDURE — 99285 EMERGENCY DEPT VISIT HI MDM: CPT | Mod: 25

## 2018-02-23 PROCEDURE — 82553 CREATINE MB FRACTION: CPT

## 2018-02-23 PROCEDURE — 93325 DOPPLER ECHO COLOR FLOW MAPG: CPT | Mod: 26

## 2018-02-23 PROCEDURE — 82550 ASSAY OF CK (CPK): CPT

## 2018-02-23 PROCEDURE — 85379 FIBRIN DEGRADATION QUANT: CPT

## 2018-02-23 PROCEDURE — 93351 STRESS TTE COMPLETE: CPT

## 2018-02-23 PROCEDURE — 84295 ASSAY OF SERUM SODIUM: CPT

## 2018-02-23 PROCEDURE — 96374 THER/PROPH/DIAG INJ IV PUSH: CPT | Mod: XU

## 2018-02-23 PROCEDURE — 84443 ASSAY THYROID STIM HORMONE: CPT

## 2018-02-23 PROCEDURE — 71275 CT ANGIOGRAPHY CHEST: CPT

## 2018-02-23 PROCEDURE — 82803 BLOOD GASES ANY COMBINATION: CPT

## 2018-02-23 PROCEDURE — 85027 COMPLETE CBC AUTOMATED: CPT

## 2018-02-23 PROCEDURE — 80048 BASIC METABOLIC PNL TOTAL CA: CPT

## 2018-02-23 PROCEDURE — 71046 X-RAY EXAM CHEST 2 VIEWS: CPT

## 2018-02-23 PROCEDURE — 84484 ASSAY OF TROPONIN QUANT: CPT

## 2018-02-23 PROCEDURE — 84480 ASSAY TRIIODOTHYRONINE (T3): CPT

## 2018-02-23 PROCEDURE — 84132 ASSAY OF SERUM POTASSIUM: CPT

## 2018-02-23 PROCEDURE — 83605 ASSAY OF LACTIC ACID: CPT

## 2018-02-23 PROCEDURE — 85014 HEMATOCRIT: CPT

## 2018-02-23 PROCEDURE — 93306 TTE W/DOPPLER COMPLETE: CPT

## 2018-02-23 PROCEDURE — 93320 DOPPLER ECHO COMPLETE: CPT

## 2018-02-23 PROCEDURE — 93350 STRESS TTE ONLY: CPT | Mod: 26

## 2018-02-23 PROCEDURE — 85610 PROTHROMBIN TIME: CPT

## 2018-02-23 PROCEDURE — 93005 ELECTROCARDIOGRAM TRACING: CPT

## 2018-02-23 RX ORDER — ACETAMINOPHEN 500 MG
650 TABLET ORAL ONCE
Qty: 0 | Refills: 0 | Status: COMPLETED | OUTPATIENT
Start: 2018-02-23 | End: 2018-02-23

## 2018-02-23 RX ORDER — ZINC SULFATE TAB 220 MG (50 MG ZINC EQUIVALENT) 220 (50 ZN) MG
0 TAB ORAL
Qty: 0 | Refills: 0 | COMMUNITY

## 2018-02-23 RX ADMIN — Medication 650 MILLIGRAM(S): at 00:48

## 2018-02-23 NOTE — PROGRESS NOTE ADULT - PROBLEM SELECTOR PLAN 2
-  - pt refused nuclear stress test yest.  - planned for stress echo this AM- if unremarkable she can be DC home and see me in office in 2 weeks.    Raúl Fierro M.D., [facility]  [phone]
Will try xanax prn for anxiety for now  f/u as outpatient

## 2018-02-23 NOTE — PROGRESS NOTE ADULT - SUBJECTIVE AND OBJECTIVE BOX
[facility] Cardiology Progress Note  _______________________________    Pt. seen and examined. No new cardiac-related complaints. No chest pain, dyspnea or palps    Telemetry -    T(C): 36.7 (18 @ 04:32), Max: 36.9 (18 @ 20:12)  HR: 71 (18 @ 04:32) (71 - 84)  BP: 110/70 (18 @ 04:32) (108/69 - 110/70)  RR: 18 (18 @ 04:32) (18 - 19)  SpO2: 98% (18 @ 04:32) (98% - 98%)  I&O's Summary    2018 07:01  -  2018 07:00  --------------------------------------------------------  IN: 990 mL / OUT: 400 mL / NET: 590 mL        PHYSICAL EXAM:  GENERAL: Alert, NAD.  NECK: Supple, No JVD, no carotid bruit.  CHEST/LUNG: Clear to auscultation bilaterally; No wheezes, rales, or rhonchi.  HEART: S1 S2 normal, RRR; No murmurs, rubs, or gallops  ABDOMEN: Soft, Nontender, Nondistended; Bowel sounds present  EXTREMITIES:  No LE edema.      LABS:                        13.5   9.49  )-----------( 228      ( 2018 07:41 )             40.4         141  |  102  |  20  ----------------------------<  128<H>  3.8   |  25  |  0.90    Ca    10.0      2018 07:41  Phos  4.1       Mg     2.3         TPro  6.9  /  Alb  3.9  /  TBili  0.8  /  DBili  x   /  AST  19  /  ALT  20  /  AlkPhos  112  02-22    PT/INR - ( 2018 14:15 )   PT: 11.6 sec;   INR: 1.07 ratio         PTT - ( 2018 14:15 )  PTT:28.4 sec  CARDIAC MARKERS ( 2018 03:03 )  x     / <0.01 ng/mL / 46 U/L / x     / 1.3 ng/mL  CARDIAC MARKERS ( 2018 20:38 )  x     / <0.01 ng/mL / x     / x     / x      CARDIAC MARKERS ( 2018 14:15 )  x     / <0.01 ng/mL / x     / x     / 1.4 ng/mL      Serum Pro-Brain Natriuretic Peptide: 136 pg/mL (18 @ 10:29)      Urinalysis Basic - ( 2018 14:37 )    Color: Yellow / Appearance: SL Turbid / S.015 / pH: x  Gluc: x / Ketone: Negative  / Bili: Negative / Urobili: Negative   Blood: x / Protein: Negative / Nitrite: Negative   Leuk Esterase: Small / RBC: 3-5 /HPF / WBC 3-5 /HPF   Sq Epi: x / Non Sq Epi: OCC /HPF / Bacteria: Few /HPF        MEDICATIONS  (STANDING):  enoxaparin Injectable 40 milliGRAM(s) SubCutaneous every 24 hours    MEDICATIONS  (PRN):  ALPRAZolam 0.25 milliGRAM(s) Oral daily PRN anxiety      RADIOLOGY & ADDITIONAL TESTS:    < from: Transthoracic Echocardiogram (18 @ 10:38) >  Conclusions:  1. Normal left ventricular internal dimensions and wall  thicknesses.  2. Normal left ventricular systolic function. No segmental  wall motion abnormalities.  3. Normal diastolic function  4. Normal right ventricular size and function.  *** Compared with echocardiogram of 2016, no  significant changes noted.  ------------------------------------------------------------------------  Confirmed on  2018 - 12:32:31 by Taylor Villegas M.D.  ------------------------------------------------------------------------    < end of copied text > [facility] Cardiology Progress Note  _______________________________    Pt. seen and examined. No new cardiac-related complaints. No chest pain, dyspnea or palps    Telemetry - sinus no events.    T(C): 36.7 (18 @ 04:32), Max: 36.9 (18 @ 20:12)  HR: 71 (18 @ 04:32) (71 - 84)  BP: 110/70 (18 @ 04:32) (108/69 - 110/70)  RR: 18 (18 @ 04:32) (18 - 19)  SpO2: 98% (18 @ 04:32) (98% - 98%)  I&O's Summary    2018 07:01  -  2018 07:00  --------------------------------------------------------  IN: 990 mL / OUT: 400 mL / NET: 590 mL        PHYSICAL EXAM:  GENERAL: Alert, NAD.  NECK: Supple, No JVD, no carotid bruit.  CHEST/LUNG: Clear to auscultation bilaterally; No wheezes, rales, or rhonchi.  HEART: S1 S2 normal, RRR; No murmurs, rubs, or gallops  ABDOMEN: Soft, Nontender, Nondistended; Bowel sounds present  EXTREMITIES:  No LE edema.      LABS:                        13.5   9.49  )-----------( 228      ( 2018 07:41 )             40.4         141  |  102  |  20  ----------------------------<  128<H>  3.8   |  25  |  0.90    Ca    10.0      2018 07:41  Phos  4.1       Mg     2.3         TPro  6.9  /  Alb  3.9  /  TBili  0.8  /  DBili  x   /  AST  19  /  ALT  20  /  AlkPhos  112      PT/INR - ( 2018 14:15 )   PT: 11.6 sec;   INR: 1.07 ratio         PTT - ( 2018 14:15 )  PTT:28.4 sec  CARDIAC MARKERS ( 2018 03:03 )  x     / <0.01 ng/mL / 46 U/L / x     / 1.3 ng/mL  CARDIAC MARKERS ( 2018 20:38 )  x     / <0.01 ng/mL / x     / x     / x      CARDIAC MARKERS ( 2018 14:15 )  x     / <0.01 ng/mL / x     / x     / 1.4 ng/mL      Serum Pro-Brain Natriuretic Peptide: 136 pg/mL (18 @ 10:29)      Urinalysis Basic - ( 2018 14:37 )    Color: Yellow / Appearance: SL Turbid / S.015 / pH: x  Gluc: x / Ketone: Negative  / Bili: Negative / Urobili: Negative   Blood: x / Protein: Negative / Nitrite: Negative   Leuk Esterase: Small / RBC: 3-5 /HPF / WBC 3-5 /HPF   Sq Epi: x / Non Sq Epi: OCC /HPF / Bacteria: Few /HPF        MEDICATIONS  (STANDING):  enoxaparin Injectable 40 milliGRAM(s) SubCutaneous every 24 hours    MEDICATIONS  (PRN):  ALPRAZolam 0.25 milliGRAM(s) Oral daily PRN anxiety      RADIOLOGY & ADDITIONAL TESTS:    < from: Transthoracic Echocardiogram (18 @ 10:38) >  Conclusions:  1. Normal left ventricular internal dimensions and wall  thicknesses.  2. Normal left ventricular systolic function. No segmental  wall motion abnormalities.  3. Normal diastolic function  4. Normal right ventricular size and function.  *** Compared with echocardiogram of 2016, no  significant changes noted.  ------------------------------------------------------------------------  Confirmed on  2018 - 12:32:31 by Taylor Villegas M.D.  ------------------------------------------------------------------------    < end of copied text > gallbladder

## 2018-02-23 NOTE — PROGRESS NOTE ADULT - PROBLEM SELECTOR PLAN 1
-  - reassuring echo results d/w pt.
no signs of CHF or ACS  appreciate cardio recs  stress test  TFT  possible anxiety related  TTE

## 2018-02-23 NOTE — PROGRESS NOTE ADULT - ASSESSMENT
52 F h/o HLD, goiter, thyroid nodule, anxiety, a/w IBRAHIM and abnormal EKG.
51 yo female wtih PMH of HLD, thyroid goiter, thyroid nodule, anxiety, not on medication, presents here with SOB.

## 2018-04-09 ENCOUNTER — OUTPATIENT (OUTPATIENT)
Dept: OUTPATIENT SERVICES | Facility: HOSPITAL | Age: 53
LOS: 1 days | End: 2018-04-09
Payer: COMMERCIAL

## 2018-04-09 ENCOUNTER — APPOINTMENT (OUTPATIENT)
Dept: MRI IMAGING | Facility: HOSPITAL | Age: 53
End: 2018-04-09
Payer: COMMERCIAL

## 2018-04-09 DIAGNOSIS — Z98.890 OTHER SPECIFIED POSTPROCEDURAL STATES: Chronic | ICD-10-CM

## 2018-04-09 DIAGNOSIS — I35.0 NONRHEUMATIC AORTIC (VALVE) STENOSIS: ICD-10-CM

## 2018-04-09 DIAGNOSIS — K21.9 GASTRO-ESOPHAGEAL REFLUX DISEASE WITHOUT ESOPHAGITIS: ICD-10-CM

## 2018-04-09 DIAGNOSIS — I25.10 ATHEROSCLEROTIC HEART DISEASE OF NATIVE CORONARY ARTERY WITHOUT ANGINA PECTORIS: ICD-10-CM

## 2018-04-09 PROCEDURE — 75561 CARDIAC MRI FOR MORPH W/DYE: CPT

## 2018-04-09 PROCEDURE — 75561 CARDIAC MRI FOR MORPH W/DYE: CPT | Mod: 26

## 2019-01-11 NOTE — ED PROVIDER NOTE - CHPI ED SYMPTOMS POS
[facility] Call Center    Phone Message    May a detailed message be left on voicemail: no    Reason for Call: Question regarding specialist protocol  Please follow protocols- only utilize this documentation for questions or concerns that are not clear in the protocol.  Contact clinic directly to clarify question(s) via phone or [email or web address] message.   Was Clinic Available: no  Question regarding protocol:  Does pt need preop prior to 2.8.19 surgery with Dr Aguilar  Is there a referral for the requested specialist/specialty? no  Name of referring provider: Lauren  Location of referring provider: [facility].  Please call patient back.  He has not rec'd any information in the mail either.       Action Taken: Message routed to:  Adult Clinics: Surgery, General p [phone]   dry cough/DYSPNEA ON EXERTION/COUGH

## 2019-03-19 PROBLEM — E04.1 NONTOXIC SINGLE THYROID NODULE: Chronic | Status: ACTIVE | Noted: 2018-02-21

## 2019-03-19 PROBLEM — F41.9 ANXIETY DISORDER, UNSPECIFIED: Chronic | Status: ACTIVE | Noted: 2018-02-21

## 2019-03-19 PROBLEM — E78.5 HYPERLIPIDEMIA, UNSPECIFIED: Chronic | Status: ACTIVE | Noted: 2018-02-21

## 2019-03-19 PROBLEM — E04.9 NONTOXIC GOITER, UNSPECIFIED: Chronic | Status: ACTIVE | Noted: 2018-02-21

## 2019-03-28 ENCOUNTER — EMERGENCY (EMERGENCY)
Facility: HOSPITAL | Age: 54
LOS: 1 days | Discharge: HOME | End: 2019-03-28
Attending: EMERGENCY MEDICINE
Payer: COMMERCIAL

## 2019-03-28 VITALS
OXYGEN SATURATION: 99 % | SYSTOLIC BLOOD PRESSURE: 160 MMHG | RESPIRATION RATE: 17 BRPM | TEMPERATURE: 99 F | DIASTOLIC BLOOD PRESSURE: 83 MMHG | HEART RATE: 111 BPM

## 2019-03-28 DIAGNOSIS — Z98.890 OTHER SPECIFIED POSTPROCEDURAL STATES: Chronic | ICD-10-CM

## 2019-03-28 PROCEDURE — 99283 EMERGENCY DEPT VISIT LOW MDM: CPT

## 2019-03-28 NOTE — ED ADULT NURSE NOTE - OBJECTIVE STATEMENT
54 y/o female PMH shingles (began treatment 2 weeks ago) presents to ED c/o abdominal pain and constipation x 1 week. Pt states she hasn't had BM in one week, has been able to pass gas. Abdominal pain is mild, reports rectal pain. She states " I feel like there is hard stool in my rectum that can't come out." Placed rectal suppository for relief today, has not been able to have BM. Reports taking pain medication and was told constipation was a side effect. Lungs clear b/l. Skin warm, dry, intact. Abd. soft, non-tender, non-distended. Gross motor and neuro intact. Safety and comfort provided. Family at bedside.

## 2019-03-29 VITALS
DIASTOLIC BLOOD PRESSURE: 82 MMHG | HEART RATE: 88 BPM | TEMPERATURE: 99 F | SYSTOLIC BLOOD PRESSURE: 120 MMHG | OXYGEN SATURATION: 97 % | RESPIRATION RATE: 18 BRPM

## 2019-03-29 PROCEDURE — 99283 EMERGENCY DEPT VISIT LOW MDM: CPT

## 2019-03-29 RX ORDER — MULTIVIT WITH MIN/MFOLATE/K2 340-15/3 G
1 POWDER (GRAM) ORAL ONCE
Qty: 0 | Refills: 0 | Status: COMPLETED | OUTPATIENT
Start: 2019-03-29 | End: 2019-03-29

## 2019-03-29 RX ADMIN — Medication 1 BOTTLE: at 00:40

## 2019-03-29 RX ADMIN — Medication 1 ENEMA: at 02:00

## 2019-03-29 NOTE — ED PROVIDER NOTE - NSFOLLOWUPINSTRUCTIONS_ED_ALL_ED_FT
Activities as tolerated. Please encourage good oral and fluid intake. For pain, please take Motrin 400mg every 4 hours as needed, or Tylenol 650mg every 6 hours as needed.    For constipation, please ingest sufficient fiber in diet, with leafy vegetables or fiber supplements. Please consider using miralax or colace as directed.    Please see your primary care doctor within 24-48 hours for further management of your symptoms.    Please seek emergent medical management if you have any worsening signs or symptoms, such as chest pain, difficulty breathing, loss of consciousness, or persistent vomiting.

## 2019-03-29 NOTE — ED PROVIDER NOTE - OBJECTIVE STATEMENT
53 year old female with unremarkable pmhx presenting with abdominal pain for 1 day and constipation for 1 week. Patient's last BM was 1 week ago, tried prunes which did not help. Tried suppository which did not give her BM. Began feeling bloated and abd discomfort today, which she came to ED for.    Denies N/V, hematochezia, CP SOB

## 2019-03-29 NOTE — ED PROVIDER NOTE - PROGRESS NOTE DETAILS
Tarah Moore PGY1  Patient disimpacted, then fleet enema, gave moderate sized BM. Patient feels better and finished Mg Citrate. stable to dc.

## 2019-03-29 NOTE — ED PROVIDER NOTE - CLINICAL SUMMARY MEDICAL DECISION MAKING FREE TEXT BOX
52 y/o female with unremarkable pmhx presenting with constipation. will give mg citrate and possible disimpaction. will reevaluate 54 y/o female with unremarkable pmhx presenting with constipation. will give mg citrate and possible disimpaction. will reevaluate  Sherly: 53 year old female with no pmhx presents to the ER with constipation x 1 week. Patient has been on tylenol # 3 for shingles.  tried a suppository tonight with no relief. abdomen soft nt/nd. resident able to disimpact little amount of stool. given fleet enema after.

## 2019-06-17 ENCOUNTER — APPOINTMENT (OUTPATIENT)
Dept: PAIN MANAGEMENT | Facility: CLINIC | Age: 54
End: 2019-06-17

## 2020-01-22 NOTE — ED PROVIDER NOTE - EYES NEGATIVE STATEMENT, MLM
no discharge, no irritation, no pain, no redness, and no visual changes.
Asthma    Cataracts, bilateral    Diabetes  type II  GERD (gastroesophageal reflux disease)    Hyperlipidemia    Lower back pain    Osteoporosis    Salivary gland stone    Sleep apnea  cPap  UTI (urinary tract infection)

## 2021-08-08 NOTE — ED ADULT NURSE NOTE - RESPIRATORY ASSESSMENT
Airway patent, TM normal bilaterally, normal appearing mouth, nose, throat, neck supple with full range of motion, no cervical adenopathy.
WDL

## 2022-08-01 NOTE — ED ADULT NURSE NOTE - PAIN RATING/NUMBER SCALE (0-10): REST
7 Oral Minoxidil Pregnancy And Lactation Text: This medication should only be used when clearly needed if you are pregnant, attempting to become pregnant or breast feeding.

## 2023-04-13 NOTE — ED ADULT TRIAGE NOTE - NS ED NOTE AC HIGH RISK COUNTRIES
Bronchoscopy Procedure Note     Findings:   Scattered mucoid secretions, otherwise no significant airway pathology visualized.     Strong concentric radial endobronchial ultrasound signal was obtained in superior segment of the left lower lobe, however catheter was unable to hold appropriate position to safely pass fine needle for biopsy. Given proximity to the pulmonary artery, risks too high and no biopsy was performed using navigational bronchoscopy platform.     Linear endobronchial ultrasound bronchoscope was then used and able to identify lesion in the posterior wall of distal left main stem. It is unclear on ultrasound if this is a solitary pulmonary nodule or a 10L lymph node. Two needle passes were performed and confirmed NSCLC on REED.     Specimen:   A: Slides, left lower lobe nodule/10L node  B: Core, left lower lobe nodule/10L node    Location: [facility] Endoscopy Suite     Date of Operation: 4/12/2023      Attending Physician Performing Procedure: Vladimir Ortiz MD     Pre-op Diagnosis: Left lower lobe pulmonary nodule     Post-op Diagnosis: Left lower lobe pulmonary nodule     Anesthesia: General, administered by Dr Rosado     Operation:   Diagnostic flexible fiberoptic bronchoscopy, with bronchoalveolar lavage, navigational bronchoscopy, radial endobronchial ultrasound, linear endobronchial ultrasound with fine needle aspiration      Estimated Blood Loss: < 10cc     Complications: None     Indications and History:     The patient is a 75 y.o. male. The risks, benefits, complications, treatment options and expected outcomes were discussed with the patient. The possibilities of reaction to medication, pulmonary aspiration, perforation of a viscus, bleeding, failure to diagnose a condition and creating a complication requiring transfusion or operation were discussed with the patient who freely signed the consent.     Description of Procedure:     The patient was seen in the Pre-op area and  interval H&P was verified. The patient was taken to the endoscopy suite, identified as Tyler Zarate and a Time Out was held and the above information confirmed. Following induction of general anesthesia and intubation, careful inspection of the tracheal lumen was accomplished with the bronchoscope. Mckenzie was noted to be sharp. There were scattered mucoid secretions bilaterally. The right upper lobe, bronchus intermedius, middle lobe, and lower lobe showed normal segmental and subsegmental anatomy. No endobronchial lesions seen. Left upper lobe proper, lingual, and lower lobe areas on the left side were similarly normal in their segmental and subsegmental anatomy with no endobronchial lesions seen.     Robotic navigation bronchoscopy was then performed with Ion platform.  Partial registration was used.  Ion robotic catheter was used to engage the left main stem and superior subsegment of left lower lobe.  Target lesion is about 1 cm in diameter.   Under navigational guidance the ion robotic catheter was advanced to <1.0 cm away from the planned target. Fluoroscopy was used to confirm catheter positioning.    Radial EBUS was performed to confirm that the location of the nodule is concentric. Additional features noted immediate proximity to the left pulmonary artery. No aspiration/biopsy was performed from this station as the catheter was unable to hold appropriate position to safely pass fine needle for biopsy.       Using the endobronchial ultrasound, a systematic survey of the accessible mediastinal and hilar lymph nodes was then performed, notable for nodule in the region of 10L. It is unclear on ultrasound if this was a separate lesion or a lymph node at station 10L. Then, under direct ultrasound visualization, transbronchial needle aspirations were performed at the following lymph node stations:     1. SIZE OF NEEDLE   22G [employer]     2. STATIONS SAMPLED  Station 10L/superior segment of the left  upper lobe, 2 passes     3. REED CONFIRMATION    On-site pathology review of slides from station 10L showed malignant cells suggestive of non-small cell, pending further immunohistochemical staining and characterization.     The airway was subsequently cleared and hemostasis was ensured. A post-procedural CXR confirmed no pneumothorax. The patient was subsequently taken to the PACU in satisfactory condition.     Son Tyler) was called and voicemail was left.    __________  Vladimir Ortiz MD  Pulmonary and Critical Care Medicine  [facility]   No

## 2023-08-14 ENCOUNTER — APPOINTMENT (OUTPATIENT)
Dept: NEUROSURGERY | Facility: CLINIC | Age: 58
End: 2023-08-14
Payer: COMMERCIAL

## 2023-08-14 VITALS
RESPIRATION RATE: 18 BRPM | TEMPERATURE: 97.2 F | HEART RATE: 98 BPM | HEIGHT: 62 IN | SYSTOLIC BLOOD PRESSURE: 131 MMHG | BODY MASS INDEX: 27.42 KG/M2 | OXYGEN SATURATION: 98 % | DIASTOLIC BLOOD PRESSURE: 83 MMHG | WEIGHT: 149 LBS

## 2023-08-14 PROCEDURE — 99204 OFFICE O/P NEW MOD 45 MIN: CPT

## 2023-08-14 RX ORDER — DULOXETINE HYDROCHLORIDE 30 MG/1
CAPSULE, DELAYED RELEASE ORAL
Refills: 0 | Status: ACTIVE | COMMUNITY

## 2023-08-14 NOTE — HISTORY OF PRESENT ILLNESS
[de-identified] : 57 year old female initially presented with chronic headache and right eye visual disturbances as well as intermittent tingling of the left arm and leg. She was sent for MRI by neurology and found to have a middle cranial fossa mass consistent with meningioma.   She was evaluated by Dr. Menon.  She is scheduled to see Dr. Colten hamilton [facility] next week. She is seeing Dr. Wallace next week.

## 2023-08-14 NOTE — DATA REVIEWED
[de-identified] : I have reviewed the most recent MRI 8/1/23 at [facility] which shows a 2cm right middle cranial fossa mass

## 2023-08-14 NOTE — ASSESSMENT
[FreeTextEntry1] : My impression is that the patient suffers from a newly diagnosed middle cranial fossa mass. The patients established problem of visual disturbance may be related.  The differential diagnosis is meningioma .  I had a long discussion with the patient regarding the role of surgical resection.  The patient was extensively educated about the nature of her disease process.   I will see the patient back once she would like to choose a date for surgery. I have explained the alternatives, risks and benefits to the patient and she understands and agrees to proceed.  This risk of the procedure including but not limited to pain, infection, seizure, stroke, recurrence, residual disease, neurovascular injury, heart attack, pulmonary embolism, blindness, weakness, paralysis and death have been carefully explained to the patient who clearly understands and agrees to proceed.

## 2023-08-14 NOTE — PHYSICAL EXAM
[General Appearance - Alert] : alert [General Appearance - In No Acute Distress] : in no acute distress [Person] : oriented to person [Place] : oriented to place [Time] : oriented to time [Cranial Nerves Optic (II)] : visual acuity intact bilaterally,  pupils equal round and reactive to light [Cranial Nerves Oculomotor (III)] : extraocular motion intact [Cranial Nerves Trigeminal (V)] : facial sensation intact symmetrically [Cranial Nerves Facial (VII)] : face symmetrical [Cranial Nerves Vestibulocochlear (VIII)] : hearing was intact bilaterally [Cranial Nerves Glossopharyngeal (IX)] : tongue and palate midline [Cranial Nerves Accessory (XI - Cranial And Spinal)] : head turning and shoulder shrug symmetric [Cranial Nerves Hypoglossal (XII)] : there was no tongue deviation with protrusion [Motor Tone] : muscle tone was normal in all four extremities [Abnormal Walk] : normal gait [Balance] : balance was intact [PERRL With Normal Accommodation] : pupils were equal in size, round, reactive to light, with normal accommodation [Extraocular Movements] : extraocular movements were intact [Full Visual Field] : full visual field [] : no respiratory distress

## 2023-08-16 ENCOUNTER — TRANSCRIPTION ENCOUNTER (OUTPATIENT)
Age: 58
End: 2023-08-16

## 2023-08-17 ENCOUNTER — APPOINTMENT (OUTPATIENT)
Dept: NEUROSURGERY | Facility: CLINIC | Age: 58
End: 2023-08-17
Payer: COMMERCIAL

## 2023-08-17 PROCEDURE — 99213 OFFICE O/P EST LOW 20 MIN: CPT | Mod: 95

## 2023-08-17 NOTE — ASSESSMENT
[FreeTextEntry1] : I discussed the role of bevacizumab however he now has multiple PEs.  I have explained the risks of the avastin to the patient's family including but not limited to pain, infection, seizure, stroke, blindness, residual or recurrent disease, neurovascular injury, weakness, paralysis, heart attack, pulmonary embolism and death and he/she clearly understands and agrees to proceed.

## 2023-08-17 NOTE — REASON FOR VISIT
[Home] : at home, [unfilled] , at the time of the visit. [Medical Office: ([facility])___] : at the medical office located in  [Spouse] : spouse [Follow-Up: _____] : a [unfilled] follow-up visit

## 2023-08-17 NOTE — HISTORY OF PRESENT ILLNESS
[de-identified] : 58 year old female initially presented with chronic headache and right eye visual disturbances as well as intermittent tingling of the left arm and leg. She was sent for MRI by neurology and found to have a middle cranial fossa mass consistent with meningioma.   She was evaluated by Dr. Menon.  She is scheduled to see Dr. Abel  on [place] next week. She is seeing Dr. Wallace next week.   She presents today via telehalth to discuss follow up questions regarding surgery.

## 2023-08-17 NOTE — HISTORY OF PRESENT ILLNESS
[de-identified] : 58 year old female initially presented with chronic headache and right eye visual disturbances as well as intermittent tingling of the left arm and leg. She was sent for MRI by neurology and found to have a middle cranial fossa mass consistent with meningioma.   She was evaluated by Dr. Menon.  She is scheduled to see Dr. Abel  on [place] next week. She is seeing Dr. Wallace next week.   She presents today via telehalth to discuss follow up questions regarding surgery.

## 2023-08-17 NOTE — REASON FOR VISIT
[Home] : at home, [unfilled] , at the time of the visit. [Medical Office: ([facility])___] : at the medical office located in  [Spouse] : spouse [Follow-Up: _____] : a [unfilled] follow-up visit

## 2023-08-17 NOTE — DATA REVIEWED
[de-identified] : I have reviewed the most recent MRI 8/1/23 at [facility] which shows a 2cm right middle cranial fossa mass

## 2023-08-17 NOTE — DATA REVIEWED
Addended by: VIDAL VELEZ on: 2/25/2022 08:36 AM     Modules accepted: Orders     [de-identified] : I have reviewed the most recent MRI 8/1/23 at [facility] which shows a 2cm right middle cranial fossa mass

## 2023-08-19 ENCOUNTER — OUTPATIENT (OUTPATIENT)
Dept: OUTPATIENT SERVICES | Facility: HOSPITAL | Age: 58
LOS: 1 days | End: 2023-08-19
Payer: COMMERCIAL

## 2023-08-19 ENCOUNTER — APPOINTMENT (OUTPATIENT)
Dept: MRI IMAGING | Facility: CLINIC | Age: 58
End: 2023-08-19
Payer: COMMERCIAL

## 2023-08-19 DIAGNOSIS — Z98.890 OTHER SPECIFIED POSTPROCEDURAL STATES: Chronic | ICD-10-CM

## 2023-08-19 DIAGNOSIS — Z00.8 ENCOUNTER FOR OTHER GENERAL EXAMINATION: ICD-10-CM

## 2023-08-19 PROCEDURE — 70544 MR ANGIOGRAPHY HEAD W/O DYE: CPT

## 2023-08-19 PROCEDURE — 70544 MR ANGIOGRAPHY HEAD W/O DYE: CPT | Mod: 26

## 2023-08-21 ENCOUNTER — APPOINTMENT (OUTPATIENT)
Dept: OPHTHALMOLOGY | Facility: CLINIC | Age: 58
End: 2023-08-21

## 2023-08-21 ENCOUNTER — NON-APPOINTMENT (OUTPATIENT)
Age: 58
End: 2023-08-21

## 2023-08-22 ENCOUNTER — APPOINTMENT (OUTPATIENT)
Dept: RADIATION ONCOLOGY | Facility: CLINIC | Age: 58
End: 2023-08-22
Payer: COMMERCIAL

## 2023-08-22 VITALS
WEIGHT: 148.81 LBS | SYSTOLIC BLOOD PRESSURE: 120 MMHG | OXYGEN SATURATION: 98 % | DIASTOLIC BLOOD PRESSURE: 77 MMHG | HEART RATE: 81 BPM | HEIGHT: 62 IN | TEMPERATURE: 97.5 F | BODY MASS INDEX: 27.38 KG/M2 | RESPIRATION RATE: 18 BRPM

## 2023-08-22 DIAGNOSIS — B02.29 OTHER POSTHERPETIC NERVOUS SYSTEM INVOLVEMENT: ICD-10-CM

## 2023-08-22 DIAGNOSIS — Z86.19 PERSONAL HISTORY OF OTHER INFECTIOUS AND PARASITIC DISEASES: ICD-10-CM

## 2023-08-22 DIAGNOSIS — Z86.39 PERSONAL HISTORY OF OTHER ENDOCRINE, NUTRITIONAL AND METABOLIC DISEASE: ICD-10-CM

## 2023-08-22 DIAGNOSIS — Z83.42 FAMILY HISTORY OF FAMILIAL HYPERCHOLESTEROLEMIA: ICD-10-CM

## 2023-08-22 DIAGNOSIS — D32.9 BENIGN NEOPLASM OF MENINGES, UNSPECIFIED: ICD-10-CM

## 2023-08-22 PROCEDURE — 99204 OFFICE O/P NEW MOD 45 MIN: CPT | Mod: 25

## 2023-08-22 NOTE — HISTORY OF PRESENT ILLNESS
[FreeTextEntry1] : This is a 57 y/o FEMALE who presents to discuss the possible role of radiation therapy in her care on the recommendation of Dr. Harsh Echevarria.  Reports visual disturbance in the RIGHT eye described as floaters since ~April 2023 but didn't find it concerning. She then saw her neurologist  Dr..Micahel Sahni as was scheduled d/t post herpetic neuralgia and mentioned to him that she was having numbness LEFT arm/hand and foot. Reports this has been intermittent and initially attributed to cardiac in origin, but cardiology workup was negative.  As a part of her workup ordered a MRI brain which was noted for a 2cm lesion consistent with a meningioma. GKRS vs open resection was discussed.  MRI brain w w/o contrast 8/1/2023 2.3cm enhancing extra axial, dural based mass encasing the right anterior clinoid process compatible with meningioma. Lesion results in 270 degrees encasement of the right M1 segment and at least 270 degrees encasement of the right supraclinoid ICA without evidence of narrowing. Slight mass effect on the right pre-chiasmatic optic nerve. Imaging done in 2017 for HAs w/o mention of brain lesion  Today reports doing well RIGHT eye floaters persists but has not worsened. Does admit to chronic visual disturbance in RIGHT eye worse when "I am tired" also admits to blurriness. Presents to discuss the possible role of radiation therapy in her care. Denies any weakness, numbness,tingling, nausea, vomiting, headaches or other neurologic symptoms. No issues with speech or comprehension. Neuro-opth pending with .

## 2023-08-22 NOTE — REVIEW OF SYSTEMS
[Negative] : Allergic/Immunologic [FreeTextEntry3] : RIGHT eye floaters [FreeTextEntry9] : RIGHT chest wall post herpetic neuralgia [de-identified] : denies HAs

## 2023-08-22 NOTE — DATA REVIEWED
[FreeTextEntry1] : I have personally reviewed the most recent MRI and compared it with the previous scans.

## 2023-08-22 NOTE — PHYSICAL EXAM
[General Appearance - Well Developed] : well developed [Sclera] : the sclera and conjunctiva were normal [Outer Ear] : the ears and nose were normal in appearance [] : no respiratory distress [Heart Rate And Rhythm] : heart rate and rhythm were normal [Normal] : no joint swelling, no clubbing or cyanosis of the fingernails and muscle strength and tone were normal [Skin Color & Pigmentation] : normal skin color and pigmentation [No Focal Deficits] : no focal deficits [Oriented To Time, Place, And Person] : oriented to person, place, and time

## 2023-08-25 ENCOUNTER — APPOINTMENT (OUTPATIENT)
Dept: MRI IMAGING | Facility: IMAGING CENTER | Age: 58
End: 2023-08-25

## 2023-09-05 ENCOUNTER — NON-APPOINTMENT (OUTPATIENT)
Age: 58
End: 2023-09-05

## 2023-09-05 ENCOUNTER — APPOINTMENT (OUTPATIENT)
Dept: OPHTHALMOLOGY | Facility: CLINIC | Age: 58
End: 2023-09-05
Payer: COMMERCIAL

## 2023-09-05 PROCEDURE — 99204 OFFICE O/P NEW MOD 45 MIN: CPT

## 2023-09-05 PROCEDURE — 92083 EXTENDED VISUAL FIELD XM: CPT

## 2023-09-05 PROCEDURE — 92133 CPTRZD OPH DX IMG PST SGM ON: CPT

## 2023-09-08 ENCOUNTER — APPOINTMENT (OUTPATIENT)
Dept: NEUROSURGERY | Facility: CLINIC | Age: 58
End: 2023-09-08
Payer: COMMERCIAL

## 2023-09-08 PROCEDURE — 99214 OFFICE O/P EST MOD 30 MIN: CPT | Mod: 95

## 2023-09-08 NOTE — REASON FOR VISIT
[Follow-Up: _____] : a [unfilled] follow-up visit [Home] : at home, [unfilled] , at the time of the visit. [Medical Office: ([facility])___] : at the medical office located in  [Spouse] : spouse

## 2023-09-08 NOTE — HISTORY OF PRESENT ILLNESS
[de-identified] : 58 year old female initially presented with chronic headache and right eye visual disturbances as well as intermittent tingling of the left arm and leg. She was sent for MRI by neurology and found to have a middle cranial fossa mass consistent with meningioma.   She was evaluated by Dr. Menon.  She saw Dr. Wallace and Dr. Abel last week. She had a normal optho exam.   She presents today via telehealth to discuss follow up questions regarding surgery.

## 2023-09-08 NOTE — DATA REVIEWED
[de-identified] : I have reviewed the most recent MRI 8/1/23 at [facility] which shows a 2cm right middle cranial fossa mass

## 2023-09-08 NOTE — ASSESSMENT
[FreeTextEntry1] : My impression is that the patient suffers from a newly diagnosed middle cranial fossa mass. The differential diagnosis is meningioma .  I had a long discussion with the patient regarding the role of surgical resection.  The patient was extensively educated about the nature of her disease process.   I will see the patient back once she would like to choose a date for surgery. I have explained the alternatives, risks and benefits to the patient and she understands and agrees to proceed.  This risk of the procedure including but not limited to pain, infection, seizure, stroke, recurrence, residual disease, neurovascular injury, heart attack, pulmonary embolism, blindness, weakness, paralysis and death have been carefully explained to the patient who clearly understands and agrees to proceed.

## 2023-12-05 ENCOUNTER — NON-APPOINTMENT (OUTPATIENT)
Age: 58
End: 2023-12-05

## 2023-12-06 ENCOUNTER — APPOINTMENT (OUTPATIENT)
Dept: OPHTHALMOLOGY | Facility: CLINIC | Age: 58
End: 2023-12-06
Payer: COMMERCIAL

## 2023-12-06 PROCEDURE — 92133 CPTRZD OPH DX IMG PST SGM ON: CPT

## 2023-12-06 PROCEDURE — 99214 OFFICE O/P EST MOD 30 MIN: CPT

## 2023-12-06 PROCEDURE — 92083 EXTENDED VISUAL FIELD XM: CPT

## 2024-01-29 VITALS
HEIGHT: 62 IN | OXYGEN SATURATION: 98 % | TEMPERATURE: 97 F | WEIGHT: 148.81 LBS | SYSTOLIC BLOOD PRESSURE: 125 MMHG | RESPIRATION RATE: 16 BRPM | DIASTOLIC BLOOD PRESSURE: 87 MMHG | HEART RATE: 100 BPM

## 2024-01-29 RX ORDER — INFLUENZA VIRUS VACCINE 15; 15; 15; 15 UG/.5ML; UG/.5ML; UG/.5ML; UG/.5ML
0.5 SUSPENSION INTRAMUSCULAR ONCE
Refills: 0 | Status: DISCONTINUED | OUTPATIENT
Start: 2024-01-30 | End: 2024-02-03

## 2024-01-29 RX ORDER — POVIDONE-IODINE 5 %
1 AEROSOL (ML) TOPICAL ONCE
Refills: 0 | Status: COMPLETED | OUTPATIENT
Start: 2024-01-30 | End: 2024-01-30

## 2024-01-29 RX ORDER — CHOLECALCIFEROL (VITAMIN D3) 125 MCG
1 CAPSULE ORAL
Qty: 0 | Refills: 0 | DISCHARGE

## 2024-01-29 NOTE — PATIENT PROFILE ADULT - FUNCTIONAL ASSESSMENT - DAILY ACTIVITY 4.
Pt presents to ED c/o abdominal pain, nausea, vomiting, and dizziness beginning this morning.  Pt states pain is radiating toward upper back to bilateral shoulders.   4 = No assist / stand by assistance

## 2024-01-30 ENCOUNTER — INPATIENT (INPATIENT)
Facility: HOSPITAL | Age: 59
LOS: 3 days | Discharge: HOME | DRG: 26 | End: 2024-02-03
Attending: NEUROLOGICAL SURGERY | Admitting: NEUROLOGICAL SURGERY
Payer: COMMERCIAL

## 2024-01-30 ENCOUNTER — RESULT REVIEW (OUTPATIENT)
Age: 59
End: 2024-01-30

## 2024-01-30 ENCOUNTER — TRANSCRIPTION ENCOUNTER (OUTPATIENT)
Age: 59
End: 2024-01-30

## 2024-01-30 DIAGNOSIS — Z98.890 OTHER SPECIFIED POSTPROCEDURAL STATES: Chronic | ICD-10-CM

## 2024-01-30 LAB
ALBUMIN SERPL ELPH-MCNC: 3.1 G/DL — LOW (ref 3.3–5)
ALP SERPL-CCNC: 108 U/L — SIGNIFICANT CHANGE UP (ref 40–120)
ALT FLD-CCNC: 17 U/L — SIGNIFICANT CHANGE UP (ref 10–45)
ANION GAP SERPL CALC-SCNC: 8 MMOL/L — SIGNIFICANT CHANGE UP (ref 5–17)
APTT BLD: 25.7 SEC — SIGNIFICANT CHANGE UP (ref 24.5–35.6)
AST SERPL-CCNC: 20 U/L — SIGNIFICANT CHANGE UP (ref 10–40)
BASE EXCESS BLDA CALC-SCNC: -2.8 MMOL/L — LOW (ref -2–3)
BILIRUB SERPL-MCNC: 0.2 MG/DL — SIGNIFICANT CHANGE UP (ref 0.2–1.2)
BLD GP AB SCN SERPL QL: NEGATIVE — SIGNIFICANT CHANGE UP
BUN SERPL-MCNC: 16 MG/DL — SIGNIFICANT CHANGE UP (ref 7–23)
CALCIUM SERPL-MCNC: 8.7 MG/DL — SIGNIFICANT CHANGE UP (ref 8.4–10.5)
CHLORIDE SERPL-SCNC: 107 MMOL/L — SIGNIFICANT CHANGE UP (ref 96–108)
CO2 BLDA-SCNC: 25 MMOL/L — HIGH (ref 19–24)
CO2 SERPL-SCNC: 24 MMOL/L — SIGNIFICANT CHANGE UP (ref 22–31)
CREAT SERPL-MCNC: 0.75 MG/DL — SIGNIFICANT CHANGE UP (ref 0.5–1.3)
EGFR: 93 ML/MIN/1.73M2 — SIGNIFICANT CHANGE UP
GLUCOSE BLDC GLUCOMTR-MCNC: 132 MG/DL — HIGH (ref 70–99)
GLUCOSE SERPL-MCNC: 170 MG/DL — HIGH (ref 70–99)
HCO3 BLDA-SCNC: 23 MMOL/L — SIGNIFICANT CHANGE UP (ref 21–28)
HCT VFR BLD CALC: 32.1 % — LOW (ref 34.5–45)
HGB BLD-MCNC: 10.8 G/DL — LOW (ref 11.5–15.5)
INR BLD: 1.05 — SIGNIFICANT CHANGE UP (ref 0.85–1.18)
LACTATE SERPL-SCNC: 1.3 MMOL/L — SIGNIFICANT CHANGE UP (ref 0.5–2)
MAGNESIUM SERPL-MCNC: 1.9 MG/DL — SIGNIFICANT CHANGE UP (ref 1.6–2.6)
MCHC RBC-ENTMCNC: 30.8 PG — SIGNIFICANT CHANGE UP (ref 27–34)
MCHC RBC-ENTMCNC: 33.6 GM/DL — SIGNIFICANT CHANGE UP (ref 32–36)
MCV RBC AUTO: 91.5 FL — SIGNIFICANT CHANGE UP (ref 80–100)
NRBC # BLD: 0 /100 WBCS — SIGNIFICANT CHANGE UP (ref 0–0)
PCO2 BLDA: 44 MMHG — SIGNIFICANT CHANGE UP (ref 32–45)
PH BLDA: 7.33 — LOW (ref 7.35–7.45)
PHOSPHATE SERPL-MCNC: 2.6 MG/DL — SIGNIFICANT CHANGE UP (ref 2.5–4.5)
PLATELET # BLD AUTO: 188 K/UL — SIGNIFICANT CHANGE UP (ref 150–400)
PO2 BLDA: 190 MMHG — HIGH (ref 83–108)
POTASSIUM SERPL-MCNC: 4.1 MMOL/L — SIGNIFICANT CHANGE UP (ref 3.5–5.3)
POTASSIUM SERPL-SCNC: 4.1 MMOL/L — SIGNIFICANT CHANGE UP (ref 3.5–5.3)
PROT SERPL-MCNC: 5 G/DL — LOW (ref 6–8.3)
PROTHROM AB SERPL-ACNC: 11.9 SEC — SIGNIFICANT CHANGE UP (ref 9.5–13)
RBC # BLD: 3.51 M/UL — LOW (ref 3.8–5.2)
RBC # FLD: 12.3 % — SIGNIFICANT CHANGE UP (ref 10.3–14.5)
RH IG SCN BLD-IMP: POSITIVE — SIGNIFICANT CHANGE UP
SAO2 % BLDA: 100 % — HIGH (ref 94–98)
SODIUM SERPL-SCNC: 139 MMOL/L — SIGNIFICANT CHANGE UP (ref 135–145)
WBC # BLD: 7.22 K/UL — SIGNIFICANT CHANGE UP (ref 3.8–10.5)
WBC # FLD AUTO: 7.22 K/UL — SIGNIFICANT CHANGE UP (ref 3.8–10.5)

## 2024-01-30 PROCEDURE — 88342 IMHCHEM/IMCYTCHM 1ST ANTB: CPT | Mod: 26

## 2024-01-30 PROCEDURE — 61601 RESECT/EXCISE CRANIAL LESION: CPT | Mod: 62,22

## 2024-01-30 PROCEDURE — 70450 CT HEAD/BRAIN W/O DYE: CPT | Mod: 26

## 2024-01-30 PROCEDURE — 88331 PATH CONSLTJ SURG 1 BLK 1SPC: CPT | Mod: 26

## 2024-01-30 PROCEDURE — 69990 MICROSURGERY ADD-ON: CPT | Mod: 80

## 2024-01-30 PROCEDURE — 88360 TUMOR IMMUNOHISTOCHEM/MANUAL: CPT | Mod: 26

## 2024-01-30 PROCEDURE — 99233 SBSQ HOSP IP/OBS HIGH 50: CPT

## 2024-01-30 PROCEDURE — 62141 CRNOP SKULL DEFECT>5 CM DIAM: CPT

## 2024-01-30 PROCEDURE — 61601 RESECT/EXCISE CRANIAL LESION: CPT | Mod: 62

## 2024-01-30 PROCEDURE — 88334 PATH CONSLTJ SURG CYTO XM EA: CPT | Mod: 26,59

## 2024-01-30 PROCEDURE — 14301 TIS TRNFR ANY 30.1-60 SQ CM: CPT

## 2024-01-30 PROCEDURE — 70552 MRI BRAIN STEM W/DYE: CPT | Mod: 26

## 2024-01-30 PROCEDURE — 61781 SCAN PROC CRANIAL INTRA: CPT

## 2024-01-30 PROCEDURE — 88341 IMHCHEM/IMCYTCHM EA ADD ANTB: CPT | Mod: 26

## 2024-01-30 PROCEDURE — 61583 CRANIOFACIAL APPROACH SKULL: CPT

## 2024-01-30 PROCEDURE — 88307 TISSUE EXAM BY PATHOLOGIST: CPT | Mod: 26

## 2024-01-30 DEVICE — SCREW UN3 AXS SELF DRILL 1.5X4MM: Type: IMPLANTABLE DEVICE | Site: RIGHT | Status: FUNCTIONAL

## 2024-01-30 DEVICE — PLATE UN3 DOUBLE-Y 6 HOLE W/BAR: Type: IMPLANTABLE DEVICE | Site: RIGHT | Status: FUNCTIONAL

## 2024-01-30 DEVICE — SURGICEL FIBRILLAR 4 X 4": Type: IMPLANTABLE DEVICE | Site: RIGHT | Status: FUNCTIONAL

## 2024-01-30 DEVICE — MESH DYNAMIC 1.7MM 90X90X.3MM: Type: IMPLANTABLE DEVICE | Site: RIGHT | Status: FUNCTIONAL

## 2024-01-30 DEVICE — SURGIFOAM PAD 8CM X 12.5CM X 10MM (100): Type: IMPLANTABLE DEVICE | Site: RIGHT | Status: FUNCTIONAL

## 2024-01-30 DEVICE — SURGIFLO HEMOSTATIC MATRIX KIT: Type: IMPLANTABLE DEVICE | Site: RIGHT | Status: FUNCTIONAL

## 2024-01-30 DEVICE — SURGCEL 4 X 8": Type: IMPLANTABLE DEVICE | Site: RIGHT | Status: FUNCTIONAL

## 2024-01-30 DEVICE — PLATE COVER BURRHOLE UN3 W/TAB 14MM: Type: IMPLANTABLE DEVICE | Site: RIGHT | Status: FUNCTIONAL

## 2024-01-30 RX ORDER — LEVETIRACETAM 250 MG/1
500 TABLET, FILM COATED ORAL EVERY 12 HOURS
Refills: 0 | Status: DISCONTINUED | OUTPATIENT
Start: 2024-01-30 | End: 2024-01-30

## 2024-01-30 RX ORDER — ONDANSETRON 8 MG/1
4 TABLET, FILM COATED ORAL EVERY 6 HOURS
Refills: 0 | Status: COMPLETED | OUTPATIENT
Start: 2024-01-30 | End: 2024-02-01

## 2024-01-30 RX ORDER — APREPITANT 80 MG/1
40 CAPSULE ORAL ONCE
Refills: 0 | Status: COMPLETED | OUTPATIENT
Start: 2024-01-30 | End: 2024-01-30

## 2024-01-30 RX ORDER — DULOXETINE HYDROCHLORIDE 30 MG/1
1 CAPSULE, DELAYED RELEASE ORAL
Refills: 0 | DISCHARGE

## 2024-01-30 RX ORDER — MAGNESIUM SULFATE 500 MG/ML
1 VIAL (ML) INJECTION ONCE
Refills: 0 | Status: COMPLETED | OUTPATIENT
Start: 2024-01-30 | End: 2024-01-30

## 2024-01-30 RX ORDER — DEXTROSE 50 % IN WATER 50 %
25 SYRINGE (ML) INTRAVENOUS ONCE
Refills: 0 | Status: DISCONTINUED | OUTPATIENT
Start: 2024-01-30 | End: 2024-01-30

## 2024-01-30 RX ORDER — ACETAMINOPHEN 500 MG
2 TABLET ORAL
Refills: 0 | DISCHARGE

## 2024-01-30 RX ORDER — HYDROMORPHONE HYDROCHLORIDE 2 MG/ML
0.25 INJECTION INTRAMUSCULAR; INTRAVENOUS; SUBCUTANEOUS ONCE
Refills: 0 | Status: DISCONTINUED | OUTPATIENT
Start: 2024-01-30 | End: 2024-01-30

## 2024-01-30 RX ORDER — CHLORHEXIDINE GLUCONATE 213 G/1000ML
1 SOLUTION TOPICAL EVERY 12 HOURS
Refills: 0 | Status: DISCONTINUED | OUTPATIENT
Start: 2024-01-30 | End: 2024-01-30

## 2024-01-30 RX ORDER — ACETAMINOPHEN 500 MG
1000 TABLET ORAL ONCE
Refills: 0 | Status: COMPLETED | OUTPATIENT
Start: 2024-01-30 | End: 2024-01-30

## 2024-01-30 RX ORDER — LEVETIRACETAM 250 MG/1
500 TABLET, FILM COATED ORAL EVERY 12 HOURS
Refills: 0 | Status: DISCONTINUED | OUTPATIENT
Start: 2024-01-30 | End: 2024-02-01

## 2024-01-30 RX ORDER — TRAMADOL HYDROCHLORIDE 50 MG/1
25 TABLET ORAL EVERY 4 HOURS
Refills: 0 | Status: DISCONTINUED | OUTPATIENT
Start: 2024-01-30 | End: 2024-02-03

## 2024-01-30 RX ORDER — POLYETHYLENE GLYCOL 3350 17 G/17G
17 POWDER, FOR SOLUTION ORAL DAILY
Refills: 0 | Status: DISCONTINUED | OUTPATIENT
Start: 2024-01-30 | End: 2024-02-03

## 2024-01-30 RX ORDER — ACETAMINOPHEN 500 MG
1000 TABLET ORAL EVERY 8 HOURS
Refills: 0 | Status: COMPLETED | OUTPATIENT
Start: 2024-01-30 | End: 2024-02-01

## 2024-01-30 RX ORDER — DULOXETINE HYDROCHLORIDE 30 MG/1
60 CAPSULE, DELAYED RELEASE ORAL DAILY
Refills: 0 | Status: DISCONTINUED | OUTPATIENT
Start: 2024-01-30 | End: 2024-02-03

## 2024-01-30 RX ORDER — PANTOPRAZOLE SODIUM 20 MG/1
40 TABLET, DELAYED RELEASE ORAL
Refills: 0 | Status: DISCONTINUED | OUTPATIENT
Start: 2024-01-30 | End: 2024-02-03

## 2024-01-30 RX ORDER — SODIUM CHLORIDE 9 MG/ML
1000 INJECTION, SOLUTION INTRAVENOUS
Refills: 0 | Status: DISCONTINUED | OUTPATIENT
Start: 2024-01-30 | End: 2024-01-30

## 2024-01-30 RX ORDER — DEXTROSE 50 % IN WATER 50 %
15 SYRINGE (ML) INTRAVENOUS ONCE
Refills: 0 | Status: DISCONTINUED | OUTPATIENT
Start: 2024-01-30 | End: 2024-01-30

## 2024-01-30 RX ORDER — DEXAMETHASONE 0.5 MG/5ML
4 ELIXIR ORAL EVERY 6 HOURS
Refills: 0 | Status: DISCONTINUED | OUTPATIENT
Start: 2024-01-30 | End: 2024-01-31

## 2024-01-30 RX ORDER — SODIUM CHLORIDE 9 MG/ML
1000 INJECTION INTRAMUSCULAR; INTRAVENOUS; SUBCUTANEOUS
Refills: 0 | Status: DISCONTINUED | OUTPATIENT
Start: 2024-01-30 | End: 2024-01-31

## 2024-01-30 RX ORDER — DEXTROSE 50 % IN WATER 50 %
12.5 SYRINGE (ML) INTRAVENOUS ONCE
Refills: 0 | Status: DISCONTINUED | OUTPATIENT
Start: 2024-01-30 | End: 2024-01-30

## 2024-01-30 RX ORDER — SENNA PLUS 8.6 MG/1
2 TABLET ORAL AT BEDTIME
Refills: 0 | Status: DISCONTINUED | OUTPATIENT
Start: 2024-01-30 | End: 2024-02-03

## 2024-01-30 RX ORDER — DULOXETINE HYDROCHLORIDE 30 MG/1
30 CAPSULE, DELAYED RELEASE ORAL AT BEDTIME
Refills: 0 | Status: DISCONTINUED | OUTPATIENT
Start: 2024-01-30 | End: 2024-02-03

## 2024-01-30 RX ORDER — GLUCAGON INJECTION, SOLUTION 0.5 MG/.1ML
1 INJECTION, SOLUTION SUBCUTANEOUS ONCE
Refills: 0 | Status: DISCONTINUED | OUTPATIENT
Start: 2024-01-30 | End: 2024-01-30

## 2024-01-30 RX ORDER — TRAMADOL HYDROCHLORIDE 50 MG/1
50 TABLET ORAL EVERY 4 HOURS
Refills: 0 | Status: DISCONTINUED | OUTPATIENT
Start: 2024-01-30 | End: 2024-02-03

## 2024-01-30 RX ORDER — CEFAZOLIN SODIUM 1 G
2000 VIAL (EA) INJECTION EVERY 8 HOURS
Refills: 0 | Status: COMPLETED | OUTPATIENT
Start: 2024-01-30 | End: 2024-01-30

## 2024-01-30 RX ORDER — ACETAMINOPHEN 500 MG
1000 TABLET ORAL EVERY 8 HOURS
Refills: 0 | Status: DISCONTINUED | OUTPATIENT
Start: 2024-01-30 | End: 2024-01-30

## 2024-01-30 RX ORDER — CHLORHEXIDINE GLUCONATE 213 G/1000ML
1 SOLUTION TOPICAL
Refills: 0 | Status: DISCONTINUED | OUTPATIENT
Start: 2024-01-30 | End: 2024-01-31

## 2024-01-30 RX ORDER — INSULIN LISPRO 100/ML
VIAL (ML) SUBCUTANEOUS
Refills: 0 | Status: DISCONTINUED | OUTPATIENT
Start: 2024-01-30 | End: 2024-02-03

## 2024-01-30 RX ADMIN — SENNA PLUS 2 TABLET(S): 8.6 TABLET ORAL at 22:29

## 2024-01-30 RX ADMIN — HYDROMORPHONE HYDROCHLORIDE 0.25 MILLIGRAM(S): 2 INJECTION INTRAMUSCULAR; INTRAVENOUS; SUBCUTANEOUS at 19:52

## 2024-01-30 RX ADMIN — TRAMADOL HYDROCHLORIDE 50 MILLIGRAM(S): 50 TABLET ORAL at 22:29

## 2024-01-30 RX ADMIN — Medication 400 MILLIGRAM(S): at 19:05

## 2024-01-30 RX ADMIN — Medication 4 MILLIGRAM(S): at 17:49

## 2024-01-30 RX ADMIN — CHLORHEXIDINE GLUCONATE 1 APPLICATION(S): 213 SOLUTION TOPICAL at 06:37

## 2024-01-30 RX ADMIN — ONDANSETRON 4 MILLIGRAM(S): 8 TABLET, FILM COATED ORAL at 19:54

## 2024-01-30 RX ADMIN — TRAMADOL HYDROCHLORIDE 50 MILLIGRAM(S): 50 TABLET ORAL at 23:24

## 2024-01-30 RX ADMIN — Medication 100 MILLIGRAM(S): at 23:09

## 2024-01-30 RX ADMIN — ONDANSETRON 4 MILLIGRAM(S): 8 TABLET, FILM COATED ORAL at 23:09

## 2024-01-30 RX ADMIN — HYDROMORPHONE HYDROCHLORIDE 0.25 MILLIGRAM(S): 2 INJECTION INTRAMUSCULAR; INTRAVENOUS; SUBCUTANEOUS at 20:00

## 2024-01-30 RX ADMIN — Medication 4 MILLIGRAM(S): at 23:10

## 2024-01-30 RX ADMIN — DULOXETINE HYDROCHLORIDE 30 MILLIGRAM(S): 30 CAPSULE, DELAYED RELEASE ORAL at 22:30

## 2024-01-30 RX ADMIN — APREPITANT 40 MILLIGRAM(S): 80 CAPSULE ORAL at 06:35

## 2024-01-30 RX ADMIN — Medication 100 MILLIGRAM(S): at 17:00

## 2024-01-30 RX ADMIN — Medication 1000 MILLIGRAM(S): at 20:00

## 2024-01-30 RX ADMIN — Medication 100 GRAM(S): at 17:49

## 2024-01-30 RX ADMIN — LEVETIRACETAM 500 MILLIGRAM(S): 250 TABLET, FILM COATED ORAL at 19:05

## 2024-01-30 RX ADMIN — Medication 1000 MILLIGRAM(S): at 06:35

## 2024-01-30 NOTE — H&P ADULT - ASSESSMENT
59 y/o female with PMHx Hyperlipidemia, Thyroid Nodule, H/O Shingles with Postherpetic neuralgia, with right middle fossa brain mass preop for craniotomy for resection today:    - NPO  - Perioperative antibiotics  - SCDs  - Admit to [facility] ICU postop    All above d/w Dr. Booker who formed above plan.
never

## 2024-01-30 NOTE — H&P ADULT - NSHPPHYSICALEXAM_GEN_ALL_CORE
Constitutional:  57 y/o  female awake, alert in no acute distress.  Eyes:  Sclera anicteric, conjunctiva noninjected.  ENMT: Oropharyngeal mucosa moist, pink. Tongue midline.    Neck: Neck supple, FROM.  No appreciable lymphadenopathy.  Back:  No pain to palpation/percussion of low back. No CVA tenderness.  Respiratory: Clear to auscultation bilaterally.  No rales, rhonchi, wheezes.  Cardiovascular: Regular rate and rhythm.  S1, S2 heard.  Gastrointestinal:  Soft, nontender, nondistended.  +BS.  Genitourinary:  Deferred.  Rectal: Deferred.  Vascular: Extremities warm, no ulcers, no discoloration of skin.   Neurological: Gen: AA&O x 3, conversant, appropriate.      CN II-XII grossly intact, except right eye with intermittent blurred vision. VF full to confrontation.    Motor: VO x 4, 5/5 throughout UE/LE.    Sens: Sensation intact to light touch throughout.    Plantar downgoing bilaterally.  No clonus.      No pronator drift, no dysmetria.  Skin: Warm, dry, no erythema.

## 2024-01-30 NOTE — PROGRESS NOTE ADULT - SUBJECTIVE AND OBJECTIVE BOX
***********************************************  ADULT [facility] PROGRESS NOTE  LAWRENCE KITCHEN [id number] [facility] [id number]  ***********************************************    Brief H & P: 57 yo F with history of chronic headaches and visual disturbance OD (likely retinal dettachment), allergy to toradol (angioedema) who [facility] R. pterional crani for resection of meningioma involving carotid/M1/optic nerve.  Post operatively, the patient has failed to arouse, and as such, she is undergoing CT head.    ROS: negative except per mentioned above in 24h interval events.      HOSPITAL COURSE CARRIED FORWARD:    VITALS:    ICU Vital Signs Last 24 Hrs  T(C): 36.3 (30 Jan 2024 08:40), Max: 36.3 (30 Jan 2024 08:40)  T(F): --  HR: 50 (30 Jan 2024 15:00) (50 - 100)  BP: 132/50 (30 Jan 2024 15:00) (110/57 - 132/50)  BP(mean): 72 (30 Jan 2024 15:00) (72 - 88)  ABP: --  ABP(mean): --  RR: 16 (30 Jan 2024 08:40) (16 - 16)  SpO2: 99% (30 Jan 2024 15:00) (98% - 100%)    O2 Parameters below as of 30 Jan 2024 15:00  Patient On (Oxygen Delivery Method): nasal cannula                I&O's Summary      EXAM:     Bharat Coma Scale: 1/1/4 = 6    General: normocephalic, atraumatic, laying in bed, in no distress  Neuro     MS: Bharat Coma Scale: 1/1/4 = 6    CN: PERRL, gaze is conjugate and midline, face symmetric at rest    Mot: bulk normal, tone normal, withdrawals bilateral UE  Chest: nonlabored respirations, no adventitious lung sounds bilaterally, heart regular rate/rhythm, present S1/S2, no murmurs or rubs  Abdomen: nondistended, soft and nontender without peritoneal signs, normoactive bowel sounds  Extremities: no clubbing, well-perfused, no edema    LABORATORY DATA:                            10.8   7.22  )-----------( 188      ( 30 Jan 2024 14:32 )             32.1     01-30    139  |  107  |  16  ----------------------------<  170<H>  4.1   |  24  |  0.75    Ca    8.7      30 Jan 2024 14:32  Phos  2.6     01-30  Mg     1.9     01-30    TPro  5.0<L>  /  Alb  3.1<L>  /  TBili  0.2  /  DBili  x   /  AST  20  /  ALT  17  /  AlkPhos  108  01-30    LIVER FUNCTIONS - ( 30 Jan 2024 14:32 )  Alb: 3.1 g/dL / Pro: 5.0 g/dL / ALK PHOS: 108 U/L / ALT: 17 U/L / AST: 20 U/L / GGT: x               IMAGING DATA:    CARDIOLOGY DATA:    MICROBIOLOGY DATA:        MEDICATIONS  (STANDING):  influenza   Vaccine 0.5 milliLiter(s) IntraMuscular once    MEDICATIONS  (PRN):      ***********************************************  ASSESSMENT AND PLAN  ***********************************************    This is an elective operative case of a R. pterional crani for resection of meningioma involving carotid/M1/optic nerve.    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 Booker), POD#0  #History of chronic headaches, retinal dettachment    NEURO  - Admit NSICU, Q1h neuro checks / Q1h vital signs  - Immediate CTH now  - LEV, dex taper  - Monitor SGJP output  - PT/OT, pain control    PULM  - SpO2 goal > 92%, supplemental O2 and pulm toileting as needed    CARDIO  - BP goal: SBP < 140  - EKG, trop    GI  - Diet: NPO for now  - Stress ulcer prophylaxis: while on dex taper  - Stool count, bowel regimen     /RENAL  - Monitor UOP/volume status, BUN/SCr  - MIVF while NPO    HEME  - Maintain Hb > 7.0, PLT > 100,000  - SCDs, holding VTE chemoppx    ID  - Periop ancef  - Monitor for infectious s/s, fever curve, leukocytosis    ENDO  - Glucose stabilizer    01-30-24 @ 15:09

## 2024-01-30 NOTE — PROGRESS NOTE ADULT - SUBJECTIVE AND OBJECTIVE BOX
NEUROSURGERY POST OP NOTE:    POD# 0 S/P R craniotomy for meningoma resection      S: Seen and examined bedside in NSICU.       T(C): 36.3 (01-30-24 @ 08:40), Max: 36.3 (01-30-24 @ 08:40)  HR: 100 (01-30-24 @ 08:40) (100 - 100)  BP: 125/87 (01-30-24 @ 08:40) (125/87 - 125/87)  RR: 16 (01-30-24 @ 08:40) (16 - 16)  SpO2: 98% (01-30-24 @ 08:40) (98% - 98%)        influenza   Vaccine 0.5 milliLiter(s) IntraMuscular once      :   Exam:    WOUND/DRAINS:  1 SG LENI         Assessment: 59 y/o female with PMHx Hyperlipidemia, Thyroid Nodule, H/O Shingles with Postherpetic neuralgia, with right middle fossa brain mass now s/p R craniotomy for resection of meningioma.    Plan:    Neuro:   - neuro/ vitals q1h  - pain control prn with CRANIAL ERAS   - final path: meningoma  - post op MRI pending  - decadron 4q6 taper to off over a week   -keppra 500 BID x7 days     Cardio:   - SBP goals     Pulm:   - RA, IS    Renal:   - NPO/ IVF   GI:   - Diet  - PPI for ulcer prophylaxis?   - Bowel regimen?     Heme:   - H&H stable     ID:   - afebrile, no leukocytosis     Endo:   - ISS       Misc:     DVT ppx:     Fall risk     Dispo:   Assessment:  Present when checked    []  GCS  E   V  M     Heart Failure: []Acute, [] acute on chronic , []chronic  Heart Failure:  [] Diastolic (HFpEF), [] Systolic (HFrEF), []Combined (HFpEF and HFrEF), [] RHF, [] Pulm HTN, [] Other    [] LINDA, [] ATN, [] AIN, [] other  [] CKD1, [] CKD2, [] CKD 3, [] CKD 4, [] CKD 5, []ESRD    Encephalopathy: [] Metabolic, [] Hepatic, [] toxic, [] Neurological, [] Other    Abnormal Nurtitional Status: [] malnurtition (see nutrition note), [ ]underweight: BMI < 19, [] morbid obesity: BMI >40, [] Cachexia    [] Sepsis  [] hypovolemic shock,[] cardiogenic shock, [] hemorrhagic shock, [] neuogenic shock  [] Acute Respiratory Failure  []Cerebral edema, [] Brain compression/ herniation,   [] Functional quadriplegia  [] Acute blood loss anemia       NEUROSURGERY POST OP NOTE:    POD# 0 S/P R pterional craniotomy for resection of meningioma involving carotid/M1/optic      S: Seen and examined bedside in NSICU.       T(C): 36.3 (01-30-24 @ 08:40), Max: 36.3 (01-30-24 @ 08:40)  HR: 100 (01-30-24 @ 08:40) (100 - 100)  BP: 125/87 (01-30-24 @ 08:40) (125/87 - 125/87)  RR: 16 (01-30-24 @ 08:40) (16 - 16)  SpO2: 98% (01-30-24 @ 08:40) (98% - 98%)        influenza   Vaccine 0.5 milliLiter(s) IntraMuscular once      Exam:    WOUND/DRAINS:  1 SG LENI       Assessment: 57 y/o female with PMHx Hyperlipidemia, Thyroid Nodule, H/O Shingles with Postherpetic neuralgia, with right middle fossa brain mass now s/p R pterional craniotomy for resection of meningioma involving carotid/M1/optic    Plan:    Neuro:   - neuro/ vitals q1h  - pain control prn with CRANIAL ERAS   - final path: meningoma  - post op MRI pending  - decadron 4q6 taper to off over a week   -keppra 500 BID x7 days     Cardio:   - SBP goals     Pulm:   - RA, IS    Renal:   - IVF    GI:   - NPO  - Bowel regimen    Heme:   -     ID:   - afebrile, no leukocytosis     Endo:   - ISS       Dispo: NSICU, pending OT/ PT eval  D/w Dr. Booker     Assessment:  Present when checked    []  GCS  E   V  M     Heart Failure: []Acute, [] acute on chronic , []chronic  Heart Failure:  [] Diastolic (HFpEF), [] Systolic (HFrEF), []Combined (HFpEF and HFrEF), [] RHF, [] Pulm HTN, [] Other    [] LINDA, [] ATN, [] AIN, [] other  [] CKD1, [] CKD2, [] CKD 3, [] CKD 4, [] CKD 5, []ESRD    Encephalopathy: [] Metabolic, [] Hepatic, [] toxic, [] Neurological, [] Other    Abnormal Nurtitional Status: [] malnurtition (see nutrition note), [ ]underweight: BMI < 19, [] morbid obesity: BMI >40, [] Cachexia    [] Sepsis  [] hypovolemic shock,[] cardiogenic shock, [] hemorrhagic shock, [] neuogenic shock  [] Acute Respiratory Failure  []Cerebral edema, [] Brain compression/ herniation,   [] Functional quadriplegia  [] Acute blood loss anemia       NEUROSURGERY POST OP NOTE:    POD# 0 S/P R pterional craniotomy for resection of meningioma involving carotid/M1/optic      S: Seen and examined bedside in NSICU.       T(C): 36.3 (01-30-24 @ 08:40), Max: 36.3 (01-30-24 @ 08:40)  HR: 100 (01-30-24 @ 08:40) (100 - 100)  BP: 125/87 (01-30-24 @ 08:40) (125/87 - 125/87)  RR: 16 (01-30-24 @ 08:40) (16 - 16)  SpO2: 98% (01-30-24 @ 08:40) (98% - 98%)        influenza   Vaccine 0.5 milliLiter(s) IntraMuscular once      Exam:    WOUND/DRAINS:  1 SG LENI       Assessment: 59 y/o female with PMHx HLD, Thyroid Nodule, postherpetic neuralgia, presented with LUE/LLE numbness (likely unrelated) and R eye blurry vision. 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 Now, s/p R pterional craniotomy for resection of b/l meningioma, frozen: meningioma 1/30/24.     Neuro:   - neuro/ vitals q1h  - pain control prn with CRANIAL ERAS   - post op MRI pending  - decadron 4q6 taper to off over a week   - keppra 500 BID x7 days   - c/w duloxetine 60mg qAM, 30mg qhs     Cardio:   - SBP <140    Pulm:   - RA, IS    GI:   - ADAT   - Bowel regimen  - PPI while on steroids     Renal:   - IVF while advancing diet     Endo:   - ISS     Heme:   - SCDs for DVT ppx    ID:   - afebrile, no leukocytosis     Dispo: NSICU, pending OT/ PT eval  D/w Dr. Booker     Assessment:  Present when checked    []  GCS  E   V  M     Heart Failure: []Acute, [] acute on chronic , []chronic  Heart Failure:  [] Diastolic (HFpEF), [] Systolic (HFrEF), []Combined (HFpEF and HFrEF), [] RHF, [] Pulm HTN, [] Other    [] LINDA, [] ATN, [] AIN, [] other  [] CKD1, [] CKD2, [] CKD 3, [] CKD 4, [] CKD 5, []ESRD    Encephalopathy: [] Metabolic, [] Hepatic, [] toxic, [] Neurological, [] Other    Abnormal Nurtitional Status: [] malnurtition (see nutrition note), [ ]underweight: BMI < 19, [] morbid obesity: BMI >40, [] Cachexia    [] Sepsis  [] hypovolemic shock,[] cardiogenic shock, [] hemorrhagic shock, [] neuogenic shock  [] Acute Respiratory Failure  []Cerebral edema, [] Brain compression/ herniation,   [] Functional quadriplegia  [] Acute blood loss anemia       NEUROSURGERY POST OP NOTE:    POD# 0 S/P R pterional craniotomy for resection of b/l meningioma    S: Seen and examined bedside in NSICU endorses incisional pain.       T(C): 36.3 (01-30-24 @ 08:40), Max: 36.3 (01-30-24 @ 08:40)  HR: 100 (01-30-24 @ 08:40) (100 - 100)  BP: 125/87 (01-30-24 @ 08:40) (125/87 - 125/87)  RR: 16 (01-30-24 @ 08:40) (16 - 16)  SpO2: 98% (01-30-24 @ 08:40) (98% - 98%)        influenza   Vaccine 0.5 milliLiter(s) IntraMuscular once      Exam:    PHYSICAL EXAM:  Constitutional: resting in bed NAD  Respiratory: non-labored breathing. Normal chest rise.   Cardiovascular: bradycardic, S1 and S2 appreciated   Gastrointestinal:  Soft, nontender, nondistended.  .  Vascular: Extremities warm, no ulcers, no discoloration of skin.   Neurological: Gen: AA&O x 3, conversant, appropriate     CN II-XII grossly intact. PERRLA 3mm brisk b/l, EOMI     Motor: VO x 4, 5/5 throughout UE/LE.    Sens: Sensation intact to light touch throughout.     Effort dependent L pronator drift   Wound/incision:   Head wrap in place c/d/i     WOUND/DRAINS:  1 SG LENI       Assessment: 59 y/o female with PMHx HLD, Thyroid Nodule, postherpetic neuralgia, presented with LUE/LLE numbness (likely unrelated) and R eye blurry vision. 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 Now, s/p R pterional craniotomy for resection of b/l meningioma, frozen: meningioma 1/30/24.     Neuro:   - neuro/ vitals q1h  - pain control prn with CRANIAL ERAS   - post op MRI pending  - decadron 4q6 taper to off over a week   - keppra 500 BID x7 days   - c/w duloxetine 60mg qAM, 30mg qhs     Cardio:   - SBP <140    Pulm:   - RA, IS    GI:   - ADAT   - Bowel regimen  - PPI while on steroids     Renal:   - IVF while advancing diet     Endo:   - ISS     Heme:   - SCDs for DVT ppx    ID:   - afebrile, no leukocytosis     Dispo: NSICU, pending OT/ PT eval  D/w Dr. Booker     Assessment:  Present when checked    []  GCS  E   V  M     Heart Failure: []Acute, [] acute on chronic , []chronic  Heart Failure:  [] Diastolic (HFpEF), [] Systolic (HFrEF), []Combined (HFpEF and HFrEF), [] RHF, [] Pulm HTN, [] Other    [] LINDA, [] ATN, [] AIN, [] other  [] CKD1, [] CKD2, [] CKD 3, [] CKD 4, [] CKD 5, []ESRD    Encephalopathy: [] Metabolic, [] Hepatic, [] toxic, [] Neurological, [] Other    Abnormal Nurtitional Status: [] malnurtition (see nutrition note), [ ]underweight: BMI < 19, [] morbid obesity: BMI >40, [] Cachexia    [] Sepsis  [] hypovolemic shock,[] cardiogenic shock, [] hemorrhagic shock, [] neuogenic shock  [] Acute Respiratory Failure  []Cerebral edema, [] Brain compression/ herniation,   [] Functional quadriplegia  [] Acute blood loss anemia       NEUROSURGERY POST OP NOTE:    POD# 0 S/P R pterional craniotomy for resection of b/l meningioma    S: Seen and examined bedside in NSICU endorses incisional pain.       T(C): 36.3 (01-30-24 @ 08:40), Max: 36.3 (01-30-24 @ 08:40)  HR: 100 (01-30-24 @ 08:40) (100 - 100)  BP: 125/87 (01-30-24 @ 08:40) (125/87 - 125/87)  RR: 16 (01-30-24 @ 08:40) (16 - 16)  SpO2: 98% (01-30-24 @ 08:40) (98% - 98%)        influenza   Vaccine 0.5 milliLiter(s) IntraMuscular once      Exam:    PHYSICAL EXAM:  Constitutional: resting in bed NAD  Respiratory: non-labored breathing. Normal chest rise.   Cardiovascular: bradycardic, S1 and S2 appreciated   Gastrointestinal:  Soft, nontender, nondistended.  .  Vascular: Extremities warm, no ulcers, no discoloration of skin.   Neurological: Gen: AA&O x 3, conversant, appropriate     CN II-XII grossly intact. PERRLA 3mm brisk b/l, EOMI     Motor: VO x 4, 5/5 throughout UE/LE.    Sens: Sensation intact to light touch throughout.     Effort dependent L pronator drift   Wound/incision: Right crani incision c/d/i with headwrap in place.     WOUND/DRAINS:  1 SG LENI       Assessment: 57 y/o female with PMHx HLD, Thyroid Nodule, postherpetic neuralgia, presented with LUE/LLE numbness (likely unrelated) and R eye blurry vision. 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 Now, s/p R pterional craniotomy for resection of b/l meningioma, frozen: meningioma 1/30/24.     Neuro:   - neuro/ vitals q1h  - pain control prn with CRANIAL ERAS   - post op MRI pending  - decadron 4q6 taper to off over a week   - keppra 500 BID x7 days   - c/w duloxetine 60mg qAM, 30mg qhs     Cardio:   - SBP <140    Pulm:   - RA, IS    GI:   - ADAT   - Bowel regimen  - PPI while on steroids     Renal:   - IVF while advancing diet     Endo:   - ISS     Heme:   - SCDs for DVT ppx    ID:   - afebrile, no leukocytosis     Dispo: NSICU, pending OT/ PT eval  D/w Dr. Booker     Assessment:  Present when checked    []  GCS  E   V  M     Heart Failure: []Acute, [] acute on chronic , []chronic  Heart Failure:  [] Diastolic (HFpEF), [] Systolic (HFrEF), []Combined (HFpEF and HFrEF), [] RHF, [] Pulm HTN, [] Other    [] LINDA, [] ATN, [] AIN, [] other  [] CKD1, [] CKD2, [] CKD 3, [] CKD 4, [] CKD 5, []ESRD    Encephalopathy: [] Metabolic, [] Hepatic, [] toxic, [] Neurological, [] Other    Abnormal Nurtitional Status: [] malnurtition (see nutrition note), [ ]underweight: BMI < 19, [] morbid obesity: BMI >40, [] Cachexia    [] Sepsis  [] hypovolemic shock,[] cardiogenic shock, [] hemorrhagic shock, [] neuogenic shock  [] Acute Respiratory Failure  []Cerebral edema, [] Brain compression/ herniation,   [] Functional quadriplegia  [] Acute blood loss anemia

## 2024-01-30 NOTE — H&P ADULT - NSHPLABSRESULTS_GEN_ALL_CORE
< from: MR Brain Stereotactic w/ IV Cont (01.30.24 @ 07:07) >    1.8 cm x 2.0 cm AP x 2.2 cm height homogeneously enhancing mass centered   along right anterior process (image 51 series 6 and series 201 image   106). The mass extends along the mesial aspect of the right middle   cranial fossa and superiorly the floor of the right anterior cranial   fossa. Medially it abuts optic chiasm and prechiasmatic portions of the   right optic nerve.     < end of copied text >

## 2024-01-30 NOTE — H&P ADULT - HISTORY OF PRESENT ILLNESS
Taken from outpatient neurosurgery note on 9/8/23 " 58 year old female initially presented with chronic headache and right eye visual disturbances as well as intermittent tingling of the left arm and leg. She was sent for MRI by neurology and found to have a middle cranial fossa mass consistent with meningioma.     She was evaluated by Dr. Menon.    She saw Dr. Wallace and Dr. Abel last week. She had a normal optho exam.  "    57 y/o female with PMHx Hyperlipidemia, Thyroid Nodule, Shingles, Postherpetic neuralgia on cymbalta, presents for meningioma surgery today.  She reports continued occasional floaters in her vision and right eye blurriness.  She saw opthalmology for follow up which showed epiretinal membrane and post vitreous detachment in both eyes, but no compressive neuropathy or field cuts.  She denies other complaints.

## 2024-01-30 NOTE — H&P ADULT - NSICDXPASTMEDICALHX_GEN_ALL_CORE_FT
PAST MEDICAL HISTORY:  Anxiety     Cardiac arrhythmia     Hyperlipidemia     Meningioma     Post herpetic neuralgia     Shingles right chest    Thyroid goiter     Thyroid nodule

## 2024-01-30 NOTE — H&P ADULT - NSICDXPASTSURGICALHX_GEN_ALL_CORE_FT
PAST SURGICAL HISTORY:  Delivered by  section     H/O colonoscopy x2    H/O laparoscopy     H/O umbilical hernia repair     S/P biopsy

## 2024-01-30 NOTE — PROGRESS NOTE ADULT - SUBJECTIVE AND OBJECTIVE BOX
NEUROCRITICAL CARE EVENING NOTE    DAY EVENTS:    - POD0 s/p R. pterional crani for resection of optic nerve meningioma (w/ involvement of ICA/M1)      VITALS/IMAGING/DATA  - Reviewed    ALLERGIES:   - Reviewed      MEDICATIONS:  - Reviewed      PHYSICAL EXAM:    General: calm  CVS: RRR  Pulm: CTAB  GI: Soft, NTND  Extremities: No LE Edema  Neuro: AOx3, PERRL, EOMI, facial symmetrical, fluent speech, motor 5/5 throughout, no PND, sensation in tact

## 2024-01-30 NOTE — PROVIDER CONTACT NOTE (OTHER) - BACKGROUND
postop arrived from OR, diaphoretic, clammy, lethargic and does not stay awake for neuro exam. Saulo to 40's. MD Huitron at bedside to assess patient.

## 2024-01-30 NOTE — PRE-ANESTHESIA EVALUATION ADULT - NSANTHOSAYNRD_GEN_A_CORE
No. EH screening performed.  STOP BANG Legend: 0-2 = LOW Risk; 3-4 = INTERMEDIATE Risk; 5-8 = HIGH Risk

## 2024-01-31 LAB
A1C WITH ESTIMATED AVERAGE GLUCOSE RESULT: 5.7 % — HIGH (ref 4–5.6)
ANION GAP SERPL CALC-SCNC: 7 MMOL/L — SIGNIFICANT CHANGE UP (ref 5–17)
BUN SERPL-MCNC: 15 MG/DL — SIGNIFICANT CHANGE UP (ref 7–23)
CALCIUM SERPL-MCNC: 8.5 MG/DL — SIGNIFICANT CHANGE UP (ref 8.4–10.5)
CHLORIDE SERPL-SCNC: 106 MMOL/L — SIGNIFICANT CHANGE UP (ref 96–108)
CO2 SERPL-SCNC: 25 MMOL/L — SIGNIFICANT CHANGE UP (ref 22–31)
CREAT SERPL-MCNC: 0.69 MG/DL — SIGNIFICANT CHANGE UP (ref 0.5–1.3)
EGFR: 102 ML/MIN/1.73M2 — SIGNIFICANT CHANGE UP
ESTIMATED AVERAGE GLUCOSE: 117 MG/DL — HIGH (ref 68–114)
GLUCOSE BLDC GLUCOMTR-MCNC: 117 MG/DL — HIGH (ref 70–99)
GLUCOSE BLDC GLUCOMTR-MCNC: 138 MG/DL — HIGH (ref 70–99)
GLUCOSE BLDC GLUCOMTR-MCNC: 147 MG/DL — HIGH (ref 70–99)
GLUCOSE BLDC GLUCOMTR-MCNC: 192 MG/DL — HIGH (ref 70–99)
GLUCOSE SERPL-MCNC: 141 MG/DL — HIGH (ref 70–99)
HCT VFR BLD CALC: 31.9 % — LOW (ref 34.5–45)
HGB BLD-MCNC: 10.3 G/DL — LOW (ref 11.5–15.5)
MAGNESIUM SERPL-MCNC: 2.2 MG/DL — SIGNIFICANT CHANGE UP (ref 1.6–2.6)
MCHC RBC-ENTMCNC: 30.1 PG — SIGNIFICANT CHANGE UP (ref 27–34)
MCHC RBC-ENTMCNC: 32.3 GM/DL — SIGNIFICANT CHANGE UP (ref 32–36)
MCV RBC AUTO: 93.3 FL — SIGNIFICANT CHANGE UP (ref 80–100)
NRBC # BLD: 0 /100 WBCS — SIGNIFICANT CHANGE UP (ref 0–0)
PHOSPHATE SERPL-MCNC: 4 MG/DL — SIGNIFICANT CHANGE UP (ref 2.5–4.5)
PLATELET # BLD AUTO: 185 K/UL — SIGNIFICANT CHANGE UP (ref 150–400)
POTASSIUM SERPL-MCNC: 4.1 MMOL/L — SIGNIFICANT CHANGE UP (ref 3.5–5.3)
POTASSIUM SERPL-SCNC: 4.1 MMOL/L — SIGNIFICANT CHANGE UP (ref 3.5–5.3)
RBC # BLD: 3.42 M/UL — LOW (ref 3.8–5.2)
RBC # FLD: 12.2 % — SIGNIFICANT CHANGE UP (ref 10.3–14.5)
SODIUM SERPL-SCNC: 138 MMOL/L — SIGNIFICANT CHANGE UP (ref 135–145)
WBC # BLD: 11.58 K/UL — HIGH (ref 3.8–10.5)
WBC # FLD AUTO: 11.58 K/UL — HIGH (ref 3.8–10.5)

## 2024-01-31 PROCEDURE — 99233 SBSQ HOSP IP/OBS HIGH 50: CPT

## 2024-01-31 PROCEDURE — 99024 POSTOP FOLLOW-UP VISIT: CPT

## 2024-01-31 RX ORDER — DEXAMETHASONE 0.5 MG/5ML
4 ELIXIR ORAL DAILY
Refills: 0 | Status: DISCONTINUED | OUTPATIENT
Start: 2024-02-04 | End: 2024-02-03

## 2024-01-31 RX ORDER — DEXAMETHASONE 0.5 MG/5ML
4 ELIXIR ORAL EVERY 8 HOURS
Refills: 0 | Status: COMPLETED | OUTPATIENT
Start: 2024-02-02 | End: 2024-02-02

## 2024-01-31 RX ORDER — DEXAMETHASONE 0.5 MG/5ML
ELIXIR ORAL
Refills: 0 | Status: DISCONTINUED | OUTPATIENT
Start: 2024-01-31 | End: 2024-02-03

## 2024-01-31 RX ORDER — DEXAMETHASONE 0.5 MG/5ML
4 ELIXIR ORAL EVERY 12 HOURS
Refills: 0 | Status: DISCONTINUED | OUTPATIENT
Start: 2024-02-03 | End: 2024-02-03

## 2024-01-31 RX ORDER — DEXAMETHASONE 0.5 MG/5ML
4 ELIXIR ORAL EVERY 6 HOURS
Refills: 0 | Status: COMPLETED | OUTPATIENT
Start: 2024-01-31 | End: 2024-02-01

## 2024-01-31 RX ORDER — DEXAMETHASONE 0.5 MG/5ML
2 ELIXIR ORAL DAILY
Refills: 0 | Status: CANCELLED | OUTPATIENT
Start: 2024-02-06 | End: 2024-02-03

## 2024-01-31 RX ADMIN — TRAMADOL HYDROCHLORIDE 50 MILLIGRAM(S): 50 TABLET ORAL at 16:06

## 2024-01-31 RX ADMIN — Medication 1000 MILLIGRAM(S): at 23:42

## 2024-01-31 RX ADMIN — Medication 4 MILLIGRAM(S): at 11:39

## 2024-01-31 RX ADMIN — TRAMADOL HYDROCHLORIDE 50 MILLIGRAM(S): 50 TABLET ORAL at 21:03

## 2024-01-31 RX ADMIN — CHLORHEXIDINE GLUCONATE 1 APPLICATION(S): 213 SOLUTION TOPICAL at 06:00

## 2024-01-31 RX ADMIN — LEVETIRACETAM 500 MILLIGRAM(S): 250 TABLET, FILM COATED ORAL at 17:34

## 2024-01-31 RX ADMIN — ONDANSETRON 4 MILLIGRAM(S): 8 TABLET, FILM COATED ORAL at 23:51

## 2024-01-31 RX ADMIN — Medication 4 MILLIGRAM(S): at 23:51

## 2024-01-31 RX ADMIN — ONDANSETRON 4 MILLIGRAM(S): 8 TABLET, FILM COATED ORAL at 06:17

## 2024-01-31 RX ADMIN — TRAMADOL HYDROCHLORIDE 25 MILLIGRAM(S): 50 TABLET ORAL at 04:30

## 2024-01-31 RX ADMIN — POLYETHYLENE GLYCOL 3350 17 GRAM(S): 17 POWDER, FOR SOLUTION ORAL at 11:39

## 2024-01-31 RX ADMIN — Medication 4 MILLIGRAM(S): at 06:18

## 2024-01-31 RX ADMIN — TRAMADOL HYDROCHLORIDE 50 MILLIGRAM(S): 50 TABLET ORAL at 16:59

## 2024-01-31 RX ADMIN — Medication 4 MILLIGRAM(S): at 17:33

## 2024-01-31 RX ADMIN — TRAMADOL HYDROCHLORIDE 50 MILLIGRAM(S): 50 TABLET ORAL at 09:42

## 2024-01-31 RX ADMIN — DULOXETINE HYDROCHLORIDE 30 MILLIGRAM(S): 30 CAPSULE, DELAYED RELEASE ORAL at 21:03

## 2024-01-31 RX ADMIN — ONDANSETRON 4 MILLIGRAM(S): 8 TABLET, FILM COATED ORAL at 17:33

## 2024-01-31 RX ADMIN — TRAMADOL HYDROCHLORIDE 50 MILLIGRAM(S): 50 TABLET ORAL at 23:42

## 2024-01-31 RX ADMIN — ONDANSETRON 4 MILLIGRAM(S): 8 TABLET, FILM COATED ORAL at 11:39

## 2024-01-31 RX ADMIN — Medication 1000 MILLIGRAM(S): at 06:17

## 2024-01-31 RX ADMIN — TRAMADOL HYDROCHLORIDE 50 MILLIGRAM(S): 50 TABLET ORAL at 08:39

## 2024-01-31 RX ADMIN — TRAMADOL HYDROCHLORIDE 50 MILLIGRAM(S): 50 TABLET ORAL at 02:09

## 2024-01-31 RX ADMIN — Medication 2: at 11:43

## 2024-01-31 RX ADMIN — PANTOPRAZOLE SODIUM 40 MILLIGRAM(S): 20 TABLET, DELAYED RELEASE ORAL at 06:17

## 2024-01-31 RX ADMIN — Medication 1000 MILLIGRAM(S): at 07:38

## 2024-01-31 RX ADMIN — LEVETIRACETAM 500 MILLIGRAM(S): 250 TABLET, FILM COATED ORAL at 06:17

## 2024-01-31 RX ADMIN — DULOXETINE HYDROCHLORIDE 60 MILLIGRAM(S): 30 CAPSULE, DELAYED RELEASE ORAL at 11:39

## 2024-01-31 RX ADMIN — SENNA PLUS 2 TABLET(S): 8.6 TABLET ORAL at 21:03

## 2024-01-31 RX ADMIN — Medication 1000 MILLIGRAM(S): at 21:02

## 2024-01-31 NOTE — PHYSICAL THERAPY INITIAL EVALUATION ADULT - GENERAL OBSERVATIONS, REHAB EVAL
Statement Selected
pt received/returned semi-supine in bed +heplock, +tele, +cranial incision C/D/I, family present, c/o mild headache

## 2024-01-31 NOTE — OCCUPATIONAL THERAPY INITIAL EVALUATION ADULT - MODALITIES TREATMENT COMMENTS
Cranial Nerves II - XII: II: PERRLA; visual fields are full to confrontation III, IV, VI: EOMI, no nystagmus appreciated V: facial sensation intact to light touch V1-V3 b/l VII: no ptosis, no facial droop, symmetric eyebrow raise and closure VIII: hearing intact to finger rub b/l  XI: head turning and shoulder shrug intact b/l XII: tongue protrusion midline, Visual Quadrant test WNL, Vision H test- smooth b/l pursuits noted

## 2024-01-31 NOTE — OCCUPATIONAL THERAPY INITIAL EVALUATION ADULT - GENERAL OBSERVATIONS, REHAB EVAL
OT IE complete. Covering TRAVIS [facility] pt. for session. Pt. received semi-supine in bed, +LENI, +crani dressing C/D/I, +tele, +heplock fatigued with a small headache but agreeable to therapy session.

## 2024-01-31 NOTE — PROGRESS NOTE ADULT - SUBJECTIVE AND OBJECTIVE BOX
NEUROCRITICAL CARE EVENING NOTE    DAY EVENTS:    - POD1 s/p R. pterional crani for resection of optic nerve meningioma (w/ involvement of ICA/M1)      VITALS/IMAGING/DATA  - Reviewed    ALLERGIES:   - Reviewed      MEDICATIONS:  - Reviewed      PHYSICAL EXAM:    General: calm  CVS: RRR  Pulm: CTAB  GI: Soft, NTND  Extremities: No LE Edema  Neuro: AOx3, PERRL, EOMI, facial symmetrical, fluent speech, motor 5/5 throughout, no PND, sensation in tact

## 2024-01-31 NOTE — OCCUPATIONAL THERAPY INITIAL EVALUATION ADULT - ADDITIONAL COMMENTS
Pt. resides with her spouse and daughter in a private home w. 2 FOS to bed/bathroom where pt. was I prior in ADLs and functional mob/transfers without AD. BR with tub shower and owns a shower chair. She is R handed

## 2024-01-31 NOTE — OCCUPATIONAL THERAPY INITIAL EVALUATION ADULT - PERTINENT HX OF CURRENT PROBLEM, REHAB EVAL
Taken from outpatient neurosurgery note on 9/8/23 " 58 year old female initially presented with chronic headache and right eye visual disturbances as well as intermittent tingling of the left arm and leg. She was sent for MRI by neurology and found to have a middle cranial fossa mass consistent with meningioma.

## 2024-01-31 NOTE — PROGRESS NOTE ADULT - SUBJECTIVE AND OBJECTIVE BOX
***********************************************  ADULT NSICU PROGRESS NOTE  LAWRENCE KITCHEN [id number] [facility] [id number]  ***********************************************    INTERVAL: no acute overnight events    ROS: negative except per mentioned above in 24h interval events.      HOSPITAL COURSE CARRIED FORWARD:    VITALS:    ICU Vital Signs Last 24 Hrs  T(C): 36.3 (30 Jan 2024 08:40), Max: 36.3 (30 Jan 2024 08:40)  T(F): --  HR: 50 (30 Jan 2024 15:00) (50 - 100)  BP: 132/50 (30 Jan 2024 15:00) (110/57 - 132/50)  BP(mean): 72 (30 Jan 2024 15:00) (72 - 88)  ABP: --  ABP(mean): --  RR: 16 (30 Jan 2024 08:40) (16 - 16)  SpO2: 99% (30 Jan 2024 15:00) (98% - 100%)    O2 Parameters below as of 30 Jan 2024 15:00  Patient On (Oxygen Delivery Method): nasal cannula                I&O's Summary      EXAM:     Bharat Coma Scale: 1/1/4 = 6    General: normocephalic, atraumatic, laying in bed, in no distress  Neuro     MS: Bharat Coma Scale: 1/1/4 = 6    CN: PERRL, gaze is conjugate and midline, face symmetric at rest    Mot: bulk normal, tone normal, withdrawals bilateral UE  Chest: nonlabored respirations, no adventitious lung sounds bilaterally, heart regular rate/rhythm, present S1/S2, no murmurs or rubs  Abdomen: nondistended, soft and nontender without peritoneal signs, normoactive bowel sounds  Extremities: no clubbing, well-perfused, no edema    LABORATORY DATA:                            10.8   7.22  )-----------( 188      ( 30 Jan 2024 14:32 )             32.1     01-30    139  |  107  |  16  ----------------------------<  170<H>  4.1   |  24  |  0.75    Ca    8.7      30 Jan 2024 14:32  Phos  2.6     01-30  Mg     1.9     01-30    TPro  5.0<L>  /  Alb  3.1<L>  /  TBili  0.2  /  DBili  x   /  AST  20  /  ALT  17  /  AlkPhos  108  01-30    LIVER FUNCTIONS - ( 30 Jan 2024 14:32 )  Alb: 3.1 g/dL / Pro: 5.0 g/dL / ALK PHOS: 108 U/L / ALT: 17 U/L / AST: 20 U/L / GGT: x               IMAGING DATA:    CARDIOLOGY DATA:    MICROBIOLOGY DATA:        MEDICATIONS  (STANDING):  influenza   Vaccine 0.5 milliLiter(s) IntraMuscular once    MEDICATIONS  (PRN):      ***********************************************  ASSESSMENT AND PLAN  ***********************************************    This is an elective operative case of a R. pterional crani for resection of meningioma involving carotid/M1/optic nerve.    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 Booker), POD#0  #History of chronic headaches, retinal dettachment    NEURO  - Admit NSICU, Q1h neuro checks / Q1h vital signs  - Immediate CTH now  - LEV, dex taper  - Monitor SGJP output  - PT/OT, pain control    PULM  - SpO2 goal > 92%, supplemental O2 and pulm toileting as needed    CARDIO  - BP goal: SBP < 140  - EKG, trop    GI  - Diet: NPO for now  - Stress ulcer prophylaxis: while on dex taper  - Stool count, bowel regimen     /RENAL  - Monitor UOP/volume status, BUN/SCr  - MIVF while NPO    HEME  - Maintain Hb > 7.0, PLT > 100,000  - SCDs, holding VTE chemoppx    ID  - Periop ancef  - Monitor for infectious s/s, fever curve, leukocytosis    ENDO  - Glucose stabilizer    01-30-24 @ 15:09       ***********************************************  ADULT NSICU PROGRESS NOTE  LAWRENCE KITCHEN [id number] [facility] [id number]  ***********************************************    INTERVAL: no acute overnight events    ROS: negative except per mentioned above in 24h interval events.      HOSPITAL COURSE CARRIED FORWARD:        EXAM:     Bharat Coma Scale: 4/5/6 = 15    General: normocephalic, (+)JPD, head wrapped, laying in bed, in no distress  Neuro     MS: A/o4, attentive, cooperative, speech fluent, comprehension intact, no paraphasic errors    CN: PERRL, gaze is conjugate and midline, face symmetric     Mot: bulk normal, tone normal, 5/5 throughout  Chest: nonlabored respirations, no adventitious lung sounds bilaterally, heart regular rate/rhythm, present S1/S2, no murmurs or rubs  Abdomen: nondistended, soft and nontender without peritoneal signs, normoactive bowel sounds  Extremities: no clubbing, well-perfused, no edema    Vital Signs Last 24 Hrs  T(C): 36.7 (31 Jan 2024 08:58), Max: 36.8 (31 Jan 2024 04:58)  T(F): 98 (31 Jan 2024 08:58), Max: 98.2 (31 Jan 2024 04:58)  HR: 82 (31 Jan 2024 11:00) (46 - 84)  BP: 101/57 (31 Jan 2024 11:00) (95/55 - 132/50)  BP(mean): 75 (31 Jan 2024 11:00) (70 - 88)  RR: 17 (31 Jan 2024 10:00) (15 - 17)  SpO2: 95% (31 Jan 2024 11:00) (95% - 100%)    Parameters below as of 31 Jan 2024 11:00  Patient On (Oxygen Delivery Method): room air      I&O's Detail    30 Jan 2024 07:01  -  31 Jan 2024 07:00  --------------------------------------------------------  IN:    IV PiggyBack: 450 mL    Oral Fluid: 120 mL    sodium chloride 0.9%: 1300 mL  Total IN: 1870 mL    OUT:    Drain (mL): 285 mL    Indwelling Catheter - Urethral (mL): 940 mL  Total OUT: 1225 mL    Total NET: 645 mL      31 Jan 2024 07:01  -  31 Jan 2024 11:44  --------------------------------------------------------  IN:    Oral Fluid: 240 mL    sodium chloride 0.9%: 65 mL  Total IN: 305 mL    OUT:  Total OUT: 0 mL    Total NET: 305 mL        ABG - ( 30 Jan 2024 15:22 )  pH, Arterial: 7.33  pH, Blood: x     /  pCO2: 44    /  pO2: 190   / HCO3: 23    / Base Excess: -2.8  /  SaO2: 100.0                                   10.3   11.58 )-----------( 185      ( 31 Jan 2024 05:30 )             31.9     01-31    138  |  106  |  15  ----------------------------<  141<H>  4.1   |  25  |  0.69    Ca    8.5      31 Jan 2024 05:30  Phos  4.0     01-31  Mg     2.2     01-31    TPro  5.0<L>  /  Alb  3.1<L>  /  TBili  0.2  /  DBili  x   /  AST  20  /  ALT  17  /  AlkPhos  108  01-30      MEDICATIONS  (STANDING):  acetaminophen     Tablet .. 1000 milliGRAM(s) Oral every 8 hours  dexAMETHasone     Tablet   Oral   dexAMETHasone     Tablet 4 milliGRAM(s) Oral every 6 hours  DULoxetine 30 milliGRAM(s) Oral at bedtime  DULoxetine 60 milliGRAM(s) Oral daily  influenza   Vaccine 0.5 milliLiter(s) IntraMuscular once  insulin lispro (ADMELOG) corrective regimen sliding scale   SubCutaneous Before meals and at bedtime  levETIRAcetam   Injectable 500 milliGRAM(s) IV Push every 12 hours  ondansetron Injectable 4 milliGRAM(s) IV Push every 6 hours  pantoprazole    Tablet 40 milliGRAM(s) Oral before breakfast  polyethylene glycol 3350 17 Gram(s) Oral daily  senna 2 Tablet(s) Oral at bedtime    MEDICATIONS  (PRN):  traMADol 25 milliGRAM(s) Oral every 4 hours PRN Moderate Pain (4 - 6)  traMADol 50 milliGRAM(s) Oral every 4 hours PRN Severe Pain (7 - 10)        ***********************************************  ASSESSMENT AND PLAN  ***********************************************    This is an elective operative case of a R. pterional crani for resection of meningioma involving carotid/M1/optic nerve.    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 Booker), POD#1  #History of chronic headaches, retinal dettachment    NEURO  - Admit NSICU, Q4/Q4  - LEV, dex taper  - Monitor SGJP output  - PT/OT, pain control    PULM  - SpO2 goal > 92%, supplemental O2 and pulm toileting as needed    CARDIO  - BP goal: SBP < 160  - EKG, trop    GI  - Diet: reg diet  - Stress ulcer prophylaxis: while on dex taper  - Stool count, bowel regimen     /RENAL  - Monitor UOP/volume status, BUN/SCr      HEME  - Maintain Hb > 7.0, PLT > 100,000  - SCDs, holding VTE chemoppx another day given > 200cc LENI output / < 24h    ID  - Periop ancef complete  - Monitor for infectious s/s, fever curve, leukocytosis    ENDO  - Glucose stabilizer     ***********************************************  ADULT NSICU PROGRESS NOTE  LAWRENCE KITCHEN [id number] [facility] 08EA [id number]  ***********************************************    INTERVAL: no acute overnight events    ROS: negative except per mentioned above in 24h interval events.      HOSPITAL COURSE CARRIED FORWARD:        EXAM:     Bharat Coma Scale: 4/5/6 = 15    General: normocephalic, (+)JPD, head wrapped, laying in bed, in no distress  Neuro     MS: A/o4, attentive, cooperative, speech fluent, comprehension intact, no paraphasic errors    CN: PERRL, gaze is conjugate and midline, face symmetric     Mot: bulk normal, tone normal, 5/5 throughout  Chest: nonlabored respirations, no adventitious lung sounds bilaterally, heart regular rate/rhythm, present S1/S2, no murmurs or rubs  Abdomen: nondistended, soft and nontender without peritoneal signs, normoactive bowel sounds  Extremities: no clubbing, well-perfused, no edema    Vital Signs Last 24 Hrs  T(C): 36.7 (31 Jan 2024 08:58), Max: 36.8 (31 Jan 2024 04:58)  T(F): 98 (31 Jan 2024 08:58), Max: 98.2 (31 Jan 2024 04:58)  HR: 82 (31 Jan 2024 11:00) (46 - 84)  BP: 101/57 (31 Jan 2024 11:00) (95/55 - 132/50)  BP(mean): 75 (31 Jan 2024 11:00) (70 - 88)  RR: 17 (31 Jan 2024 10:00) (15 - 17)  SpO2: 95% (31 Jan 2024 11:00) (95% - 100%)    Parameters below as of 31 Jan 2024 11:00  Patient On (Oxygen Delivery Method): room air      I&O's Detail    30 Jan 2024 07:01  -  31 Jan 2024 07:00  --------------------------------------------------------  IN:    IV PiggyBack: 450 mL    Oral Fluid: 120 mL    sodium chloride 0.9%: 1300 mL  Total IN: 1870 mL    OUT:    Drain (mL): 285 mL    Indwelling Catheter - Urethral (mL): 940 mL  Total OUT: 1225 mL    Total NET: 645 mL      31 Jan 2024 07:01  -  31 Jan 2024 11:44  --------------------------------------------------------  IN:    Oral Fluid: 240 mL    sodium chloride 0.9%: 65 mL  Total IN: 305 mL    OUT:  Total OUT: 0 mL    Total NET: 305 mL        ABG - ( 30 Jan 2024 15:22 )  pH, Arterial: 7.33  pH, Blood: x     /  pCO2: 44    /  pO2: 190   / HCO3: 23    / Base Excess: -2.8  /  SaO2: 100.0                                   10.3   11.58 )-----------( 185      ( 31 Jan 2024 05:30 )             31.9     01-31    138  |  106  |  15  ----------------------------<  141<H>  4.1   |  25  |  0.69    Ca    8.5      31 Jan 2024 05:30  Phos  4.0     01-31  Mg     2.2     01-31    TPro  5.0<L>  /  Alb  3.1<L>  /  TBili  0.2  /  DBili  x   /  AST  20  /  ALT  17  /  AlkPhos  108  01-30      MEDICATIONS  (STANDING):  acetaminophen     Tablet .. 1000 milliGRAM(s) Oral every 8 hours  dexAMETHasone     Tablet   Oral   dexAMETHasone     Tablet 4 milliGRAM(s) Oral every 6 hours  DULoxetine 30 milliGRAM(s) Oral at bedtime  DULoxetine 60 milliGRAM(s) Oral daily  influenza   Vaccine 0.5 milliLiter(s) IntraMuscular once  insulin lispro (ADMELOG) corrective regimen sliding scale   SubCutaneous Before meals and at bedtime  levETIRAcetam   Injectable 500 milliGRAM(s) IV Push every 12 hours  ondansetron Injectable 4 milliGRAM(s) IV Push every 6 hours  pantoprazole    Tablet 40 milliGRAM(s) Oral before breakfast  polyethylene glycol 3350 17 Gram(s) Oral daily  senna 2 Tablet(s) Oral at bedtime    MEDICATIONS  (PRN):  traMADol 25 milliGRAM(s) Oral every 4 hours PRN Moderate Pain (4 - 6)  traMADol 50 milliGRAM(s) Oral every 4 hours PRN Severe Pain (7 - 10)        ***********************************************  ASSESSMENT AND PLAN  ***********************************************    This is an elective operative case of a R. pterional crani for resection of meningioma involving carotid/M1/optic nerve.    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 Booker), POD#1  #History of chronic headaches, retinal dettachment    NEURO  - Admit NSICU, Q4/Q4  - LEV, dex taper  - Monitor SGJP output  - PT/OT, pain control    PULM  - SpO2 goal > 92%, supplemental O2 and pulm toileting as needed    CARDIO  - BP goal: SBP < 160  - EKG, trop    GI  - Diet: reg diet  - Stress ulcer prophylaxis: while on dex taper  - Stool count, bowel regimen     /RENAL  - Monitor UOP/volume status, BUN/SCr      HEME  - Maintain Hb > 7.0, PLT > 100,000  - SCDs, holding VTE chemoppx another day given > 200cc LENI output / < 24h    ID  - Periop ancef complete  - Monitor for infectious s/s, fever curve, leukocytosis    ENDO  - Glucose stabilizer      ICU stepdown Checklist:    [X] hemodynamically stable – VS WNL and stable x 24hours, UO adequate  [X] if  previously on HDA - off pressors x 24h with stable neuro exam   [X] no new symptoms x 24h (i.e. new fever, new-onset nausea/vomiting)  [X] stable labs: (i.e. WBC not rising, sodium not dropping)  [X] patient not at high risk for aspiration, if high risk then:                  [ ] should have definitive plans for trach/PEG (alternative option is to discharge from ICU to facilty)                  [ ] stepdown to bed close to nurse’s station  [X] low suctioning requirements (i.e. q4h or less)  [X] sign-off from primary RN*  [X] drains do not require ICU level of care  [X] if patient previously agitated or with behavioral issues – controlled  [X] pain controlled

## 2024-01-31 NOTE — PHYSICAL THERAPY INITIAL EVALUATION ADULT - PERTINENT HX OF CURRENT PROBLEM, REHAB EVAL
59 y/o female with PMHx HLD, Thyroid Nodule, postherpetic neuralgia, presented with LUE/LLE numbness (likely unrelated) and R eye blurry vision. 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 Now, s/p R pterional craniotomy for resection of b/l meningioma, frozen: meningioma 1/30/24.

## 2024-01-31 NOTE — PROGRESS NOTE ADULT - SUBJECTIVE AND OBJECTIVE BOX
NEUROCRITICAL CARE PROGRESS NOTE    LAWRENCE KITCHEN   MRN-[id number]  Summary:  /  HPI:  Taken from outpatient neurosurgery note on 9/8/23 " 58 year old female initially presented with chronic headache and right eye visual disturbances as well as intermittent tingling of the left arm and leg. She was sent for MRI by neurology and found to have a middle cranial fossa mass consistent with meningioma.     She was evaluated by Dr. Menon.    She saw Dr. Wallace and Dr. Abel last week. She had a normal optho exam.  "    57 y/o female with PMHx Hyperlipidemia, Thyroid Nodule, Shingles, Postherpetic neuralgia on cymbalta, presents for meningioma surgery today.  She reports continued occasional floaters in her vision and right eye blurriness.  She saw opthalmology for follow up which showed epiretinal membrane and post vitreous detachment in both eyes, but no compressive neuropathy or field cuts.  She denies other complaints. (30 Jan 2024 08:10)      S/Overnight events:   POD 1. STACIA overnight. Neuro stable.     Hospital Course:  1/30: POD 0. Post op slow to wake up, CTH performed. Shows small right SDH.  1/31: POD 1. STACIA overnight. Neuro stable.     Vital Signs Last 24 Hrs  T(C): 36.4 (30 Jan 2024 21:57), Max: 36.4 (30 Jan 2024 21:57)  T(F): 97.5 (30 Jan 2024 21:57), Max: 97.5 (30 Jan 2024 21:57)  HR: 47 (31 Jan 2024 00:00) (46 - 100)  BP: 126/59 (30 Jan 2024 17:00) (104/56 - 132/50)  BP(mean): 84 (30 Jan 2024 17:00) (72 - 88)  RR: 16 (31 Jan 2024 00:00) (15 - 17)  SpO2: 99% (31 Jan 2024 00:00) (97% - 100%)    Parameters below as of 31 Jan 2024 00:00  Patient On (Oxygen Delivery Method): room air            I&O's Detail    30 Jan 2024 07:01  -  31 Jan 2024 00:24  --------------------------------------------------------  IN:    IV PiggyBack: 400 mL    Oral Fluid: 120 mL    sodium chloride 0.9%: 845 mL  Total IN: 1365 mL    OUT:    Drain (mL): 205 mL    Indwelling Catheter - Urethral (mL): 535 mL  Total OUT: 740 mL    Total NET: 625 mL    PHYSICAL EXAM:  Constitutional: Patient is resting comfortably in stretcher, in no acute distress.   Respiratory: breathing non-labored, symmetrical chest wall movement  Cardiovascuar: RRR, no murmurs  Gastrointestinal: abdomen soft, non tender  Genitourinary: exam deffered  Neurological:  AAOX3. Verbal function intact  Cranial Nerves: II-XII grossly intact  Motor: 5/5 power in b/l UE and LE  Sensation: intact to light touch in all extremities  Pronator Drift: none  Dysmetria: none  Wound: R crani incision with headwrap in place, C/D/I, SG LENI x1 in place with sanguinous output    LABS:                        10.8   7.22  )-----------( 188      ( 30 Jan 2024 14:32 )             32.1     01-30    139  |  107  |  16  ----------------------------<  170<H>  4.1   |  24  |  0.75    Ca    8.7      30 Jan 2024 14:32  Phos  2.6     01-30  Mg     1.9     01-30    TPro  5.0<L>  /  Alb  3.1<L>  /  TBili  0.2  /  DBili  x   /  AST  20  /  ALT  17  /  AlkPhos  108  01-30    PT/INR - ( 30 Jan 2024 14:32 )   PT: 11.9 sec;   INR: 1.05          PTT - ( 30 Jan 2024 14:32 )  PTT:25.7 sec  Urinalysis Basic - ( 30 Jan 2024 14:32 )    Color: x / Appearance: x / SG: x / pH: x  Gluc: 170 mg/dL / Ketone: x  / Bili: x / Urobili: x   Blood: x / Protein: x / Nitrite: x   Leuk Esterase: x / RBC: x / WBC x   Sq Epi: x / Non Sq Epi: x / Bacteria: x          CAPILLARY BLOOD GLUCOSE      POCT Blood Glucose.: 132 mg/dL (30 Jan 2024 17:15)      Drug Levels: [] N/A    CSF Analysis: [] N/A      Allergies    iv dye (Hives; Urticaria)  penicillin (Angioedema)  Seafood (Hives; Rash)  erythromycin (Hives)  ibuprofen (Angioedema)    Intolerances      MEDICATIONS:  Antibiotics:    Neuro:  acetaminophen     Tablet .. 1000 milliGRAM(s) Oral every 8 hours  DULoxetine 30 milliGRAM(s) Oral at bedtime  DULoxetine 60 milliGRAM(s) Oral daily  levETIRAcetam   Injectable 500 milliGRAM(s) IV Push every 12 hours  ondansetron Injectable 4 milliGRAM(s) IV Push every 6 hours  traMADol 50 milliGRAM(s) Oral every 4 hours PRN  traMADol 25 milliGRAM(s) Oral every 4 hours PRN    Anticoagulation:    OTHER:  chlorhexidine 2% Cloths 1 Application(s) Topical <User Schedule>  dexAMETHasone     Tablet   Oral   influenza   Vaccine 0.5 milliLiter(s) IntraMuscular once  insulin lispro (ADMELOG) corrective regimen sliding scale   SubCutaneous three times a day before meals  pantoprazole    Tablet 40 milliGRAM(s) Oral before breakfast  polyethylene glycol 3350 17 Gram(s) Oral daily  senna 2 Tablet(s) Oral at bedtime    IVF:  sodium chloride 0.9%. 1000 milliLiter(s) IV Continuous <Continuous>    CULTURES:      ASSESSMENT:  57 y/o female with PMHx HLD, Thyroid Nodule, postherpetic neuralgia, presented with LUE/LLE numbness (likely unrelated) and R eye blurry vision. 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 Now, s/p R pterional craniotomy for resection of b/l meningioma, frozen: meningioma 1/30/24.     Neuro:   - neuro/ vitals q1h  - pain control: cranial eras   - pending post op MRI  - decadron 4q6 taper to off over a week   - keppra 500 BID x7 days   - c/w duloxetine 60mg qAM, 30mg qhs   - CTH 1/30: small right SDH  - Subgaleal LENI drain x1, monitor output     Cardio:   - SBP <140    Pulm:   - RA, IS    GI:   - ADAT   - Bowel regimen  - PPI while on steroids     Renal:   - Na goal 135-145  - IVF while advancing diet   - cassia     Endo:   - ISS   - f/u A1C    Heme:   - SCDs for DVT ppx    ID:   - afebrile  - post op ancef     Dispo: NSICU, full code, pending OT/ PT eval  D/w Dr. Booker and Dr Huitron

## 2024-01-31 NOTE — OCCUPATIONAL THERAPY INITIAL EVALUATION ADULT - DIAGNOSIS, OT EVAL
Pt. is POD#1 s/p R pterional craniotomy for resection of b/l  optic meningioma. Upon assessment, pt. demo slight balance and activity tolerance deficits impacting engagement in ADLs and functional mob/transfers.

## 2024-01-31 NOTE — PHYSICAL THERAPY INITIAL EVALUATION ADULT - GAIT DEVIATIONS NOTED, PT EVAL
decreased benjamin/increased time in double stance/decreased step length/decreased weight-shifting ability

## 2024-01-31 NOTE — OCCUPATIONAL THERAPY INITIAL EVALUATION ADULT - MODIFIED CLINICAL TEST OF SENSORY INTEGRATION IN BALANCE TEST
Pt. performed functional mob in hallway with portable monitor and CGA, no LOB, slightly unsteady most likely 2/2 first time OOB

## 2024-02-01 LAB
ANION GAP SERPL CALC-SCNC: 5 MMOL/L — SIGNIFICANT CHANGE UP (ref 5–17)
BUN SERPL-MCNC: 19 MG/DL — SIGNIFICANT CHANGE UP (ref 7–23)
CALCIUM SERPL-MCNC: 8.8 MG/DL — SIGNIFICANT CHANGE UP (ref 8.4–10.5)
CHLORIDE SERPL-SCNC: 106 MMOL/L — SIGNIFICANT CHANGE UP (ref 96–108)
CO2 SERPL-SCNC: 27 MMOL/L — SIGNIFICANT CHANGE UP (ref 22–31)
CREAT SERPL-MCNC: 0.79 MG/DL — SIGNIFICANT CHANGE UP (ref 0.5–1.3)
EGFR: 88 ML/MIN/1.73M2 — SIGNIFICANT CHANGE UP
GLUCOSE BLDC GLUCOMTR-MCNC: 120 MG/DL — HIGH (ref 70–99)
GLUCOSE BLDC GLUCOMTR-MCNC: 122 MG/DL — HIGH (ref 70–99)
GLUCOSE BLDC GLUCOMTR-MCNC: 137 MG/DL — HIGH (ref 70–99)
GLUCOSE BLDC GLUCOMTR-MCNC: 169 MG/DL — HIGH (ref 70–99)
GLUCOSE SERPL-MCNC: 148 MG/DL — HIGH (ref 70–99)
HCT VFR BLD CALC: 31.5 % — LOW (ref 34.5–45)
HGB BLD-MCNC: 10.2 G/DL — LOW (ref 11.5–15.5)
MAGNESIUM SERPL-MCNC: 2.4 MG/DL — SIGNIFICANT CHANGE UP (ref 1.6–2.6)
MCHC RBC-ENTMCNC: 30.4 PG — SIGNIFICANT CHANGE UP (ref 27–34)
MCHC RBC-ENTMCNC: 32.4 GM/DL — SIGNIFICANT CHANGE UP (ref 32–36)
MCV RBC AUTO: 93.8 FL — SIGNIFICANT CHANGE UP (ref 80–100)
NRBC # BLD: 0 /100 WBCS — SIGNIFICANT CHANGE UP (ref 0–0)
PHOSPHATE SERPL-MCNC: 3.6 MG/DL — SIGNIFICANT CHANGE UP (ref 2.5–4.5)
PLATELET # BLD AUTO: 194 K/UL — SIGNIFICANT CHANGE UP (ref 150–400)
POTASSIUM SERPL-MCNC: 4.4 MMOL/L — SIGNIFICANT CHANGE UP (ref 3.5–5.3)
POTASSIUM SERPL-SCNC: 4.4 MMOL/L — SIGNIFICANT CHANGE UP (ref 3.5–5.3)
RBC # BLD: 3.36 M/UL — LOW (ref 3.8–5.2)
RBC # FLD: 12.4 % — SIGNIFICANT CHANGE UP (ref 10.3–14.5)
SODIUM SERPL-SCNC: 138 MMOL/L — SIGNIFICANT CHANGE UP (ref 135–145)
WBC # BLD: 13.03 K/UL — HIGH (ref 3.8–10.5)
WBC # FLD AUTO: 13.03 K/UL — HIGH (ref 3.8–10.5)

## 2024-02-01 PROCEDURE — 99024 POSTOP FOLLOW-UP VISIT: CPT

## 2024-02-01 PROCEDURE — 99233 SBSQ HOSP IP/OBS HIGH 50: CPT

## 2024-02-01 RX ORDER — LEVETIRACETAM 250 MG/1
500 TABLET, FILM COATED ORAL EVERY 12 HOURS
Refills: 0 | Status: DISCONTINUED | OUTPATIENT
Start: 2024-02-01 | End: 2024-02-03

## 2024-02-01 RX ORDER — ENOXAPARIN SODIUM 100 MG/ML
40 INJECTION SUBCUTANEOUS
Refills: 0 | Status: DISCONTINUED | OUTPATIENT
Start: 2024-02-01 | End: 2024-02-03

## 2024-02-01 RX ADMIN — DULOXETINE HYDROCHLORIDE 60 MILLIGRAM(S): 30 CAPSULE, DELAYED RELEASE ORAL at 11:01

## 2024-02-01 RX ADMIN — ENOXAPARIN SODIUM 40 MILLIGRAM(S): 100 INJECTION SUBCUTANEOUS at 22:53

## 2024-02-01 RX ADMIN — DULOXETINE HYDROCHLORIDE 30 MILLIGRAM(S): 30 CAPSULE, DELAYED RELEASE ORAL at 22:52

## 2024-02-01 RX ADMIN — LEVETIRACETAM 500 MILLIGRAM(S): 250 TABLET, FILM COATED ORAL at 05:23

## 2024-02-01 RX ADMIN — TRAMADOL HYDROCHLORIDE 50 MILLIGRAM(S): 50 TABLET ORAL at 05:23

## 2024-02-01 RX ADMIN — ONDANSETRON 4 MILLIGRAM(S): 8 TABLET, FILM COATED ORAL at 05:23

## 2024-02-01 RX ADMIN — Medication 4 MILLIGRAM(S): at 17:26

## 2024-02-01 RX ADMIN — Medication 1000 MILLIGRAM(S): at 05:22

## 2024-02-01 RX ADMIN — TRAMADOL HYDROCHLORIDE 25 MILLIGRAM(S): 50 TABLET ORAL at 10:52

## 2024-02-01 RX ADMIN — POLYETHYLENE GLYCOL 3350 17 GRAM(S): 17 POWDER, FOR SOLUTION ORAL at 11:01

## 2024-02-01 RX ADMIN — Medication 4 MILLIGRAM(S): at 10:52

## 2024-02-01 RX ADMIN — TRAMADOL HYDROCHLORIDE 50 MILLIGRAM(S): 50 TABLET ORAL at 18:08

## 2024-02-01 RX ADMIN — ONDANSETRON 4 MILLIGRAM(S): 8 TABLET, FILM COATED ORAL at 11:01

## 2024-02-01 RX ADMIN — SENNA PLUS 2 TABLET(S): 8.6 TABLET ORAL at 22:52

## 2024-02-01 RX ADMIN — LEVETIRACETAM 500 MILLIGRAM(S): 250 TABLET, FILM COATED ORAL at 18:04

## 2024-02-01 RX ADMIN — Medication 2: at 11:38

## 2024-02-01 RX ADMIN — TRAMADOL HYDROCHLORIDE 50 MILLIGRAM(S): 50 TABLET ORAL at 17:27

## 2024-02-01 RX ADMIN — Medication 1000 MILLIGRAM(S): at 14:08

## 2024-02-01 RX ADMIN — Medication 1000 MILLIGRAM(S): at 06:44

## 2024-02-01 RX ADMIN — TRAMADOL HYDROCHLORIDE 25 MILLIGRAM(S): 50 TABLET ORAL at 11:45

## 2024-02-01 RX ADMIN — Medication 1000 MILLIGRAM(S): at 13:30

## 2024-02-01 RX ADMIN — Medication 4 MILLIGRAM(S): at 22:52

## 2024-02-01 RX ADMIN — TRAMADOL HYDROCHLORIDE 50 MILLIGRAM(S): 50 TABLET ORAL at 06:44

## 2024-02-01 RX ADMIN — PANTOPRAZOLE SODIUM 40 MILLIGRAM(S): 20 TABLET, DELAYED RELEASE ORAL at 06:31

## 2024-02-01 RX ADMIN — Medication 4 MILLIGRAM(S): at 05:23

## 2024-02-01 NOTE — PROGRESS NOTE ADULT - SUBJECTIVE AND OBJECTIVE BOX
NEUROCRITICAL CARE PROGRESS NOTE    LAWRENCE KITCHNE   MRN-[id number]  Summary:  /  HPI:  Taken from outpatient neurosurgery note on 9/8/23 " 58 year old female initially presented with chronic headache and right eye visual disturbances as well as intermittent tingling of the left arm and leg. She was sent for MRI by neurology and found to have a middle cranial fossa mass consistent with meningioma.     She was evaluated by Dr. Menon.    She saw Dr. Wallace and Dr. Abel last week. She had a normal optho exam.  "    57 y/o female with PMHx Hyperlipidemia, Thyroid Nodule, Shingles, Postherpetic neuralgia on cymbalta, presents for meningioma surgery today.  She reports continued occasional floaters in her vision and right eye blurriness.  She saw opthalmology for follow up which showed epiretinal membrane and post vitreous detachment in both eyes, but no compressive neuropathy or field cuts.  She denies other complaints. (30 Jan 2024 08:10)      S/Overnight events:  POD 2. STACIA overnight. Neuro stable.     Hospital Course:  1/30: POD 0. Post op slow to wake up, CTH performed. Shows small right SDH.  1/31: POD 1. STACIA overnight. Neurologically stable throughout the day, SD status. Granda removed, passed TOV.  2/1: POD 2. STACIA overnight. Neuro stable.     Vital Signs Last 24 Hrs  T(C): 36.8 (01 Feb 2024 01:04), Max: 36.9 (31 Jan 2024 22:02)  T(F): 98.3 (01 Feb 2024 01:04), Max: 98.4 (31 Jan 2024 22:02)  HR: 61 (01 Feb 2024 01:00) (48 - 93)  BP: 107/64 (01 Feb 2024 01:00) (93/51 - 127/74)  BP(mean): 80 (01 Feb 2024 01:00) (68 - 91)  RR: 16 (01 Feb 2024 01:00) (16 - 17)  SpO2: 96% (01 Feb 2024 01:00) (95% - 98%)    Parameters below as of 01 Feb 2024 01:00  Patient On (Oxygen Delivery Method): room air            I&O's Detail    30 Jan 2024 07:01  -  31 Jan 2024 07:00  --------------------------------------------------------  IN:    IV PiggyBack: 450 mL    Oral Fluid: 120 mL    sodium chloride 0.9%: 1300 mL  Total IN: 1870 mL    OUT:    Drain (mL): 285 mL    Indwelling Catheter - Urethral (mL): 940 mL  Total OUT: 1225 mL    Total NET: 645 mL      31 Jan 2024 07:01  -  01 Feb 2024 02:08  --------------------------------------------------------  IN:    Oral Fluid: 980 mL    sodium chloride 0.9%: 65 mL  Total IN: 1045 mL    OUT:    Drain (mL): 70 mL    Voided (mL): 600 mL  Total OUT: 670 mL    Total NET: 375 mL    PHYSICAL EXAM:  Constitutional: Patient is resting comfortably in stretcher, in no acute distress.   Respiratory: breathing non-labored, symmetrical chest wall movement  Cardiovascuar: RRR, no murmurs  Gastrointestinal: abdomen soft, non tender  Genitourinary: exam deffered  Neurological:  AAOX3. Verbal function intact  R superior nasal VF cut (patient's baseline) otherwise Cranial Nerves: II-XII grossly intact  Motor: 5/5 power in b/l UE and LE  Sensation: intact to light touch in all extremities  Pronator Drift: none  Dysmetria: none  Wound: R crani incision with headwrap in place, C/D/I, SG LENI x1 in place with sanguinous output    LABS:                        10.3   11.58 )-----------( 185      ( 31 Jan 2024 05:30 )             31.9     01-31    138  |  106  |  15  ----------------------------<  141<H>  4.1   |  25  |  0.69    Ca    8.5      31 Jan 2024 05:30  Phos  4.0     01-31  Mg     2.2     01-31    TPro  5.0<L>  /  Alb  3.1<L>  /  TBili  0.2  /  DBili  x   /  AST  20  /  ALT  17  /  AlkPhos  108  01-30    PT/INR - ( 30 Jan 2024 14:32 )   PT: 11.9 sec;   INR: 1.05          PTT - ( 30 Jan 2024 14:32 )  PTT:25.7 sec  Urinalysis Basic - ( 31 Jan 2024 05:30 )    Color: x / Appearance: x / SG: x / pH: x  Gluc: 141 mg/dL / Ketone: x  / Bili: x / Urobili: x   Blood: x / Protein: x / Nitrite: x   Leuk Esterase: x / RBC: x / WBC x   Sq Epi: x / Non Sq Epi: x / Bacteria: x          CAPILLARY BLOOD GLUCOSE      POCT Blood Glucose.: 138 mg/dL (31 Jan 2024 21:10)  POCT Blood Glucose.: 147 mg/dL (31 Jan 2024 16:25)  POCT Blood Glucose.: 192 mg/dL (31 Jan 2024 11:36)  POCT Blood Glucose.: 117 mg/dL (31 Jan 2024 06:25)      Drug Levels: [] N/A    CSF Analysis: [] N/A      Allergies    iv dye (Hives; Urticaria)  penicillin (Angioedema)  Seafood (Hives; Rash)  erythromycin (Hives)  ibuprofen (Angioedema)    Intolerances      MEDICATIONS:  Antibiotics:    Neuro:  acetaminophen     Tablet .. 1000 milliGRAM(s) Oral every 8 hours  DULoxetine 30 milliGRAM(s) Oral at bedtime  DULoxetine 60 milliGRAM(s) Oral daily  levETIRAcetam   Injectable 500 milliGRAM(s) IV Push every 12 hours  ondansetron Injectable 4 milliGRAM(s) IV Push every 6 hours  traMADol 25 milliGRAM(s) Oral every 4 hours PRN  traMADol 50 milliGRAM(s) Oral every 4 hours PRN    Anticoagulation:  enoxaparin Injectable 40 milliGRAM(s) SubCutaneous <User Schedule>    OTHER:  dexAMETHasone     Tablet   Oral   dexAMETHasone     Tablet 4 milliGRAM(s) Oral every 6 hours  influenza   Vaccine 0.5 milliLiter(s) IntraMuscular once  insulin lispro (ADMELOG) corrective regimen sliding scale   SubCutaneous Before meals and at bedtime  pantoprazole    Tablet 40 milliGRAM(s) Oral before breakfast  polyethylene glycol 3350 17 Gram(s) Oral daily  senna 2 Tablet(s) Oral at bedtime    IVF:    CULTURES:    ASSESSMENT:  57 y/o female with PMHx HLD, Thyroid Nodule, postherpetic neuralgia, presented with LUE/LLE numbness (likely unrelated) and R eye blurry vision. 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 Now, s/p R pterional craniotomy for resection of b/l meningioma, frozen: meningioma 1/30/24.     Neuro:   - neuro/vitals q4h  - pain control: cranial eras, tramadol prn  - pending post op MRI  - decadron 4q6 taper to off over a week   - keppra 500 BID x7 days   - c/w duloxetine 60mg qAM, 30mg qhs   - CTH 1/30: small right SDH  - Subgaleal LENI drain x1, monitor output     Cardio:   - SBP <140    Pulm:   - RA, IS    GI:   - regular diet  - Bowel regimen  - PPI while on steroids     Renal:   - Na goal 135-145  - IVL  - voiding    Endo:   - ISS   - A1C 5.7    Heme:   - SCDs, SQL for DVT ppx    ID:   - afebrile    Dispo: ICU, full code, PT/OT rec home no needs  D/w Dr. Booker and Dr Huitron

## 2024-02-01 NOTE — PROGRESS NOTE ADULT - TIME BILLING
reviewing pertinent data, performing a physical examination, formulating a plan, and counseling the patient.
reviewing pertinent data, performing a physical examination, formulating a plan, and counseling the patient.

## 2024-02-01 NOTE — PROGRESS NOTE ADULT - SUBJECTIVE AND OBJECTIVE BOX
***********************************************  ADULT NSICU PROGRESS NOTE  LAWRENCE KITCHEN [id number] [facility] [id number]  ***********************************************    INTERVAL: no acute overnight events    ROS: negative except per mentioned above in 24h interval events.      HOSPITAL COURSE CARRIED FORWARD:        EXAM:     [place] Coma Scale: 4/5/6 = 15    General: normocephalic, (+)JPD, head wrapped, laying in bed, in no distress  Neuro     MS: A/o4, attentive, cooperative, speech fluent, comprehension intact, no paraphasic errors    CN: PERRL, gaze is conjugate and midline, face symmetric     Mot: bulk normal, tone normal, 5/5 throughout  Chest: nonlabored respirations, no adventitious lung sounds bilaterally, heart regular rate/rhythm, present S1/S2, no murmurs or rubs  Abdomen: nondistended, soft and nontender without peritoneal signs, normoactive bowel sounds  Extremities: no clubbing, well-perfused, no edema    Vital Signs Last 24 Hrs  T(C): 36.7 (31 Jan 2024 08:58), Max: 36.8 (31 Jan 2024 04:58)  T(F): 98 (31 Jan 2024 08:58), Max: 98.2 (31 Jan 2024 04:58)  HR: 82 (31 Jan 2024 11:00) (46 - 84)  BP: 101/57 (31 Jan 2024 11:00) (95/55 - 132/50)  BP(mean): 75 (31 Jan 2024 11:00) (70 - 88)  RR: 17 (31 Jan 2024 10:00) (15 - 17)  SpO2: 95% (31 Jan 2024 11:00) (95% - 100%)    Parameters below as of 31 Jan 2024 11:00  Patient On (Oxygen Delivery Method): room air      I&O's Detail    30 Jan 2024 07:01  -  31 Jan 2024 07:00  --------------------------------------------------------  IN:    IV PiggyBack: 450 mL    Oral Fluid: 120 mL    sodium chloride 0.9%: 1300 mL  Total IN: 1870 mL    OUT:    Drain (mL): 285 mL    Indwelling Catheter - Urethral (mL): 940 mL  Total OUT: 1225 mL    Total NET: 645 mL      31 Jan 2024 07:01  -  31 Jan 2024 11:44  --------------------------------------------------------  IN:    Oral Fluid: 240 mL    sodium chloride 0.9%: 65 mL  Total IN: 305 mL    OUT:  Total OUT: 0 mL    Total NET: 305 mL        ABG - ( 30 Jan 2024 15:22 )  pH, Arterial: 7.33  pH, Blood: x     /  pCO2: 44    /  pO2: 190   / HCO3: 23    / Base Excess: -2.8  /  SaO2: 100.0                                   10.3   11.58 )-----------( 185      ( 31 Jan 2024 05:30 )             31.9     01-31    138  |  106  |  15  ----------------------------<  141<H>  4.1   |  25  |  0.69    Ca    8.5      31 Jan 2024 05:30  Phos  4.0     01-31  Mg     2.2     01-31    TPro  5.0<L>  /  Alb  3.1<L>  /  TBili  0.2  /  DBili  x   /  AST  20  /  ALT  17  /  AlkPhos  108  01-30      MEDICATIONS  (STANDING):  acetaminophen     Tablet .. 1000 milliGRAM(s) Oral every 8 hours  dexAMETHasone     Tablet   Oral   dexAMETHasone     Tablet 4 milliGRAM(s) Oral every 6 hours  DULoxetine 30 milliGRAM(s) Oral at bedtime  DULoxetine 60 milliGRAM(s) Oral daily  influenza   Vaccine 0.5 milliLiter(s) IntraMuscular once  insulin lispro (ADMELOG) corrective regimen sliding scale   SubCutaneous Before meals and at bedtime  levETIRAcetam   Injectable 500 milliGRAM(s) IV Push every 12 hours  ondansetron Injectable 4 milliGRAM(s) IV Push every 6 hours  pantoprazole    Tablet 40 milliGRAM(s) Oral before breakfast  polyethylene glycol 3350 17 Gram(s) Oral daily  senna 2 Tablet(s) Oral at bedtime    MEDICATIONS  (PRN):  traMADol 25 milliGRAM(s) Oral every 4 hours PRN Moderate Pain (4 - 6)  traMADol 50 milliGRAM(s) Oral every 4 hours PRN Severe Pain (7 - 10)        ***********************************************  ASSESSMENT AND PLAN  ***********************************************    This is an elective operative case of a R. pterional crani for resection of meningioma involving carotid/M1/optic nerve.    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 Booker), POD#1  #History of chronic headaches, retinal dettachment    NEURO  - Admit NSICU, Q4/Q4  - LEV, dex taper  - Monitor SGJP output  - PT/OT, pain control    PULM  - SpO2 goal > 92%, supplemental O2 and pulm toileting as needed    CARDIO  - BP goal: SBP < 160  - EKG, trop    GI  - Diet: reg diet  - Stress ulcer prophylaxis: while on dex taper  - Stool count, bowel regimen     /RENAL  - Monitor UOP/volume status, BUN/SCr      HEME  - Maintain Hb > 7.0, PLT > 100,000  - SCDs, holding VTE chemoppx another day given > 200cc LENI output / < 24h    ID  - Periop ancef complete  - Monitor for infectious s/s, fever curve, leukocytosis    ENDO  - Glucose stabilizer      ICU stepdown Checklist:    [X] hemodynamically stable – VS WNL and stable x 24hours, UO adequate  [X] if  previously on HDA - off pressors x 24h with stable neuro exam   [X] no new symptoms x 24h (i.e. new fever, new-onset nausea/vomiting)  [X] stable labs: (i.e. WBC not rising, sodium not dropping)  [X] patient not at high risk for aspiration, if high risk then:                  [ ] should have definitive plans for trach/PEG (alternative option is to discharge from ICU to facilty)                  [ ] stepdown to bed close to nurse’s station  [X] low suctioning requirements (i.e. q4h or less)  [X] sign-off from primary RN*  [X] drains do not require ICU level of care  [X] if patient previously agitated or with behavioral issues – controlled  [X] pain controlled     ***********************************************  ADULT NSICU PROGRESS NOTE  LAWRENCE KITCHEN [id number] [facility] 08EA [id number]  ***********************************************    INTERVAL: no acute overnight events    ROS: negative except per mentioned above in 24h interval events.      EXAM:     [place] Coma Scale: 4/5/6 = 15    General: normocephalic, (+)JPD, head wrapped, laying in bed, in no distress  Neuro     MS: A/o4, attentive, cooperative, speech fluent, comprehension intact, no paraphasic errors    CN: PERRL, gaze is conjugate and midline, face symmetric     Mot: bulk normal, tone normal, 5/5 throughout  Chest: nonlabored respirations, no adventitious lung sounds bilaterally, heart regular rate/rhythm, present S1/S2, no murmurs or rubs  Abdomen: nondistended, soft and nontender without peritoneal signs, normoactive bowel sounds  Extremities: no clubbing, well-perfused, no edema    Vital Signs Last 24 Hrs  T(C): 36.9 (01 Feb 2024 09:02), Max: 36.9 (31 Jan 2024 22:02)  T(F): 98.4 (01 Feb 2024 09:02), Max: 98.4 (31 Jan 2024 22:02)  HR: 65 (01 Feb 2024 12:00) (56 - 107)  BP: 101/56 (01 Feb 2024 12:00) (93/51 - 118/63)  BP(mean): 75 (01 Feb 2024 12:00) (68 - 87)  RR: 16 (01 Feb 2024 13:00) (16 - 17)  SpO2: 96% (01 Feb 2024 12:00) (95% - 97%)    Parameters below as of 01 Feb 2024 13:00  Patient On (Oxygen Delivery Method): room air      I&O's Detail    31 Jan 2024 07:01  -  01 Feb 2024 07:00  --------------------------------------------------------  IN:    Oral Fluid: 1220 mL    sodium chloride 0.9%: 65 mL  Total IN: 1285 mL    OUT:    Drain (mL): 70 mL    Voided (mL): 601 mL  Total OUT: 671 mL    Total NET: 614 mL      01 Feb 2024 07:01  -  01 Feb 2024 14:48  --------------------------------------------------------  IN:    Oral Fluid: 500 mL  Total IN: 500 mL    OUT:  Total OUT: 0 mL    Total NET: 500 mL        ABG - ( 30 Jan 2024 15:22 )  pH, Arterial: 7.33  pH, Blood: x     /  pCO2: 44    /  pO2: 190   / HCO3: 23    / Base Excess: -2.8  /  SaO2: 100.0                                   10.2   13.03 )-----------( 194      ( 01 Feb 2024 05:20 )             31.5     02-01    138  |  106  |  19  ----------------------------<  148<H>  4.4   |  27  |  0.79    Ca    8.8      01 Feb 2024 05:20  Phos  3.6     02-01  Mg     2.4     02-01        MEDICATIONS  (STANDING):  dexAMETHasone     Tablet   Oral   dexAMETHasone     Tablet 4 milliGRAM(s) Oral every 6 hours  DULoxetine 30 milliGRAM(s) Oral at bedtime  DULoxetine 60 milliGRAM(s) Oral daily  enoxaparin Injectable 40 milliGRAM(s) SubCutaneous <User Schedule>  influenza   Vaccine 0.5 milliLiter(s) IntraMuscular once  insulin lispro (ADMELOG) corrective regimen sliding scale   SubCutaneous Before meals and at bedtime  levETIRAcetam   Injectable 500 milliGRAM(s) IV Push every 12 hours  pantoprazole    Tablet 40 milliGRAM(s) Oral before breakfast  polyethylene glycol 3350 17 Gram(s) Oral daily  senna 2 Tablet(s) Oral at bedtime    MEDICATIONS  (PRN):  traMADol 25 milliGRAM(s) Oral every 4 hours PRN Moderate Pain (4 - 6)  traMADol 50 milliGRAM(s) Oral every 4 hours PRN Severe Pain (7 - 10)      ***********************************************  ASSESSMENT AND PLAN  ***********************************************    This is an elective operative case of a R. pterional crani for resection of meningioma involving carotid/M1/optic nerve.    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 Booker), POD#2  #History of chronic headaches, retinal (v. vitreous?) dettachment    NEURO  - Admit NSICU, Q4/Q4  - LEV, dex taper  - Monitor SGJP output  - PT/OT, pain control    PULM  - SpO2 goal > 92%, supplemental O2 and pulm toileting as needed    CARDIO  - BP goal: SBP < 160  - EKG, trop    GI  - Diet: reg diet  - Stress ulcer prophylaxis: while on dex taper  - Stool count, bowel regimen     /RENAL  - Monitor UOP/volume status, BUN/SCr      HEME  - Maintain Hb > 7.0, PLT > 100,000  - SCDs, SQL for VTE chemoppx    ID  - Periop ancef complete  - Monitor for infectious s/s, fever curve, leukocytosis    ENDO  - Glucose stabilizer      ICU stepdown Checklist:    [X] hemodynamically stable – VS WNL and stable x 24hours, UO adequate  [X] if  previously on HDA - off pressors x 24h with stable neuro exam   [X] no new symptoms x 24h (i.e. new fever, new-onset nausea/vomiting)  [X] stable labs: (i.e. WBC not rising, sodium not dropping)  [X] patient not at high risk for aspiration, if high risk then:                  [ ] should have definitive plans for trach/PEG (alternative option is to discharge from ICU to facilty)                  [ ] stepdown to bed close to nurse’s station  [X] low suctioning requirements (i.e. q4h or less)  [X] sign-off from primary RN*  [X] drains do not require ICU level of care  [X] if patient previously agitated or with behavioral issues – controlled  [X] pain controlled

## 2024-02-02 ENCOUNTER — TRANSCRIPTION ENCOUNTER (OUTPATIENT)
Age: 59
End: 2024-02-02

## 2024-02-02 PROBLEM — B02.29 OTHER POSTHERPETIC NERVOUS SYSTEM INVOLVEMENT: Chronic | Status: ACTIVE | Noted: 2024-01-29

## 2024-02-02 PROBLEM — D32.9 BENIGN NEOPLASM OF MENINGES, UNSPECIFIED: Chronic | Status: ACTIVE | Noted: 2024-01-29

## 2024-02-02 PROBLEM — I49.9 CARDIAC ARRHYTHMIA, UNSPECIFIED: Chronic | Status: ACTIVE | Noted: 2024-01-29

## 2024-02-02 LAB
ANION GAP SERPL CALC-SCNC: 7 MMOL/L — SIGNIFICANT CHANGE UP (ref 5–17)
BUN SERPL-MCNC: 23 MG/DL — SIGNIFICANT CHANGE UP (ref 7–23)
CALCIUM SERPL-MCNC: 8.8 MG/DL — SIGNIFICANT CHANGE UP (ref 8.4–10.5)
CHLORIDE SERPL-SCNC: 104 MMOL/L — SIGNIFICANT CHANGE UP (ref 96–108)
CO2 SERPL-SCNC: 28 MMOL/L — SIGNIFICANT CHANGE UP (ref 22–31)
CREAT SERPL-MCNC: 0.74 MG/DL — SIGNIFICANT CHANGE UP (ref 0.5–1.3)
EGFR: 95 ML/MIN/1.73M2 — SIGNIFICANT CHANGE UP
GLUCOSE BLDC GLUCOMTR-MCNC: 114 MG/DL — HIGH (ref 70–99)
GLUCOSE BLDC GLUCOMTR-MCNC: 119 MG/DL — HIGH (ref 70–99)
GLUCOSE BLDC GLUCOMTR-MCNC: 131 MG/DL — HIGH (ref 70–99)
GLUCOSE BLDC GLUCOMTR-MCNC: 136 MG/DL — HIGH (ref 70–99)
GLUCOSE SERPL-MCNC: 116 MG/DL — HIGH (ref 70–99)
HCT VFR BLD CALC: 33 % — LOW (ref 34.5–45)
HGB BLD-MCNC: 10.7 G/DL — LOW (ref 11.5–15.5)
MAGNESIUM SERPL-MCNC: 2.2 MG/DL — SIGNIFICANT CHANGE UP (ref 1.6–2.6)
MCHC RBC-ENTMCNC: 30.6 PG — SIGNIFICANT CHANGE UP (ref 27–34)
MCHC RBC-ENTMCNC: 32.4 GM/DL — SIGNIFICANT CHANGE UP (ref 32–36)
MCV RBC AUTO: 94.3 FL — SIGNIFICANT CHANGE UP (ref 80–100)
NRBC # BLD: 0 /100 WBCS — SIGNIFICANT CHANGE UP (ref 0–0)
PHOSPHATE SERPL-MCNC: 3.5 MG/DL — SIGNIFICANT CHANGE UP (ref 2.5–4.5)
PLATELET # BLD AUTO: 181 K/UL — SIGNIFICANT CHANGE UP (ref 150–400)
POTASSIUM SERPL-MCNC: 4.1 MMOL/L — SIGNIFICANT CHANGE UP (ref 3.5–5.3)
POTASSIUM SERPL-SCNC: 4.1 MMOL/L — SIGNIFICANT CHANGE UP (ref 3.5–5.3)
RBC # BLD: 3.5 M/UL — LOW (ref 3.8–5.2)
RBC # FLD: 12.4 % — SIGNIFICANT CHANGE UP (ref 10.3–14.5)
SODIUM SERPL-SCNC: 139 MMOL/L — SIGNIFICANT CHANGE UP (ref 135–145)
SURGICAL PATHOLOGY STUDY: SIGNIFICANT CHANGE UP
WBC # BLD: 10.45 K/UL — SIGNIFICANT CHANGE UP (ref 3.8–10.5)
WBC # FLD AUTO: 10.45 K/UL — SIGNIFICANT CHANGE UP (ref 3.8–10.5)

## 2024-02-02 PROCEDURE — 70553 MRI BRAIN STEM W/O & W/DYE: CPT | Mod: 26

## 2024-02-02 PROCEDURE — 99222 1ST HOSP IP/OBS MODERATE 55: CPT

## 2024-02-02 PROCEDURE — 99024 POSTOP FOLLOW-UP VISIT: CPT

## 2024-02-02 RX ORDER — ACETAMINOPHEN 500 MG
650 TABLET ORAL EVERY 6 HOURS
Refills: 0 | Status: DISCONTINUED | OUTPATIENT
Start: 2024-02-02 | End: 2024-02-03

## 2024-02-02 RX ADMIN — TRAMADOL HYDROCHLORIDE 25 MILLIGRAM(S): 50 TABLET ORAL at 11:21

## 2024-02-02 RX ADMIN — LEVETIRACETAM 500 MILLIGRAM(S): 250 TABLET, FILM COATED ORAL at 05:45

## 2024-02-02 RX ADMIN — TRAMADOL HYDROCHLORIDE 25 MILLIGRAM(S): 50 TABLET ORAL at 05:48

## 2024-02-02 RX ADMIN — Medication 4 MILLIGRAM(S): at 16:08

## 2024-02-02 RX ADMIN — Medication 4 MILLIGRAM(S): at 07:34

## 2024-02-02 RX ADMIN — TRAMADOL HYDROCHLORIDE 25 MILLIGRAM(S): 50 TABLET ORAL at 06:47

## 2024-02-02 RX ADMIN — LEVETIRACETAM 500 MILLIGRAM(S): 250 TABLET, FILM COATED ORAL at 17:24

## 2024-02-02 RX ADMIN — TRAMADOL HYDROCHLORIDE 25 MILLIGRAM(S): 50 TABLET ORAL at 12:21

## 2024-02-02 RX ADMIN — ENOXAPARIN SODIUM 40 MILLIGRAM(S): 100 INJECTION SUBCUTANEOUS at 22:12

## 2024-02-02 RX ADMIN — PANTOPRAZOLE SODIUM 40 MILLIGRAM(S): 20 TABLET, DELAYED RELEASE ORAL at 05:45

## 2024-02-02 RX ADMIN — TRAMADOL HYDROCHLORIDE 25 MILLIGRAM(S): 50 TABLET ORAL at 16:19

## 2024-02-02 RX ADMIN — TRAMADOL HYDROCHLORIDE 25 MILLIGRAM(S): 50 TABLET ORAL at 17:19

## 2024-02-02 RX ADMIN — Medication 4 MILLIGRAM(S): at 22:10

## 2024-02-02 RX ADMIN — POLYETHYLENE GLYCOL 3350 17 GRAM(S): 17 POWDER, FOR SOLUTION ORAL at 11:19

## 2024-02-02 RX ADMIN — SENNA PLUS 2 TABLET(S): 8.6 TABLET ORAL at 22:10

## 2024-02-02 RX ADMIN — DULOXETINE HYDROCHLORIDE 60 MILLIGRAM(S): 30 CAPSULE, DELAYED RELEASE ORAL at 11:19

## 2024-02-02 RX ADMIN — DULOXETINE HYDROCHLORIDE 30 MILLIGRAM(S): 30 CAPSULE, DELAYED RELEASE ORAL at 22:11

## 2024-02-02 NOTE — DISCHARGE NOTE PROVIDER - NSDCMRMEDTOKEN_GEN_ALL_CORE_FT
acetaminophen 500 mg oral capsule: 2 cap(s) orally prn  DULoxetine 30 mg oral delayed release capsule: 1 cap(s) orally once a day (at bedtime)  DULoxetine 60 mg oral delayed release capsule: 1 cap(s) orally once a day  Multiple Vitamins oral capsule: 1 cap(s) orally once a day  Vitamin D3 1000 intl units oral tablet: 1 tab(s) orally once a day   acetaminophen 500 mg oral capsule: 2 cap(s) orally prn  dexAMETHasone 2 mg oral tablet: 2 tab(s) orally 2 times a day Taper plan:  2/3 &amp; 2/4: Take 2 tabs two times a day  2/5 &amp; 2/6: Take 1 tab two times a day  DULoxetine 30 mg oral delayed release capsule: 1 cap(s) orally once a day (at bedtime)  DULoxetine 60 mg oral delayed release capsule: 1 cap(s) orally once a day  levETIRAcetam 500 mg oral tablet: 1 tab(s) orally every 12 hours  Multiple Vitamins oral capsule: 1 cap(s) orally once a day  pantoprazole 40 mg oral delayed release tablet: 1 tab(s) orally once a day (before a meal)  polyethylene glycol 3350 oral powder for reconstitution: 17 gram(s) orally once a day  traMADol 50 mg oral tablet: 1 tab(s) orally every 4 hours as needed for  severe pain can be constipating; take over the counter stool softeners as needed MDD: do not take more than 6 tabs/day  Vitamin D3 1000 intl units oral tablet: 1 tab(s) orally once a day

## 2024-02-02 NOTE — DISCHARGE NOTE PROVIDER - PROVIDER TOKENS
PROVIDER:[TOKEN:[[id number]]] PROVIDER:[TOKEN:[[id number]],SCHEDULEDAPPT:[02/15/2024],SCHEDULEDAPPTTIME:[10:30 AM]]

## 2024-02-02 NOTE — DISCHARGE NOTE PROVIDER - NSDCFUADDAPPT_GEN_ALL_CORE_FT
Please follow up with Dr. Bundyvar     Follow up with your primary care doctor Please follow up with Dr. Booker / MARGOTH Parsons on 2/15 at 10:30am    Follow up with your primary care doctor

## 2024-02-02 NOTE — CHART NOTE - NSCHARTNOTEFT_GEN_A_CORE
NUTRITION BRIEF NOTE  Met with patient on 9 [place]. Reports good PO intake PTA. Reports food allergies to fin fish and shellfish (sx: difficulty breathing and hospital admittance); and peanut (sx: migraines). EMR updated and communicated with kitchen. Current diet order meets patient needs; recommend continue. Full documentation to follow.     DIANE Gaona MS, RD, CDN  Registered Dietitian  [id number] NUTRITION BRIEF NOTE  Met with patient on 9 [place]. Reports good PO intake PTA. Reports food allergies to fin fish and shellfish (sx: difficulty breathing and hospital admittance); and peanut (sx: migraines). EMR updated and communicated with kitchen. Current diet order meets patient needs; recommend continue. Full documentation to follow as able.     DIANE Gaona MS, RD, CDN  Registered Dietitian  [id number]

## 2024-02-02 NOTE — DISCHARGE NOTE PROVIDER - CARE PROVIDER_API CALL
Ivan Booker  Neurosurgery  [address] [facility] [place] [address]  Phone: [phone]  Fax: [phone]  Follow Up Time:    Ivan Booker  Neurosurgery  [address] [place] [address]  Phone: [phone]  Fax: [phone]  Scheduled Appointment: 02/15/2024 10:30 AM

## 2024-02-02 NOTE — DISCHARGE NOTE PROVIDER - HOSPITAL COURSE
HPI:  59 y/o female with PMHx Hyperlipidemia, Thyroid Nodule, Shingles, Postherpetic neuralgia on cymbalta, presents for meningioma surgery today.  She reports continued occasional floaters in her vision and right eye blurriness.  She saw opthalmology for follow up which showed epiretinal membrane and post vitreous detachment in both eyes, but no compressive neuropathy or field cuts.  She denies other complaints.    Surgery: R pterional craniotomy for resection of b/l meningioma, frozen: meningioma 1/30/24.     Hospital Course:  1/30: POD 0. Post op slow to wake up, CTH performed. Shows small right SDH.  1/31: POD 1. STACIA overnight. Neurologically stable throughout the day, SD status. Granda removed, passed TOV.  2/1: POD 2. STACIA overnight. Neuro stable.   2/2: POD 3. STACIA overnight, neuro stable.   2/3: ____________    Patient evaluated by PT/OT who recommended: home, no skilled needs  Hospital course c/b:     Exam on day of discharge:  Neurologically intact; baseline R eye blurred vision  Sutures? Staples? HPI:  59 y/o female with PMHx Hyperlipidemia, Thyroid Nodule, Shingles, Postherpetic neuralgia on cymbalta, presents for meningioma surgery today.  She reports continued occasional floaters in her vision and right eye blurriness.  She saw opthalmology for follow up which showed epiretinal membrane and post vitreous detachment in both eyes, but no compressive neuropathy or field cuts.  She denies other complaints.    Surgery: R pterional craniotomy for resection of b/l meningioma, frozen: meningioma 1/30/24.     Hospital Course:  1/30: POD 0. Post op slow to wake up, CTH performed. Shows small right SDH.  1/31: POD 1. STACIA overnight. Neurologically stable throughout the day, SD status. Granda removed, passed TOV.  2/1: POD 2. STACIA overnight. Neuro stable.   2/2: POD 3. STACIA overnight, neuro stable. MRI completed.  2/3: POD 4. LENI removed. Medically cleared for DC home    Patient evaluated by PT/OT who recommended: home, no skilled needs    Exam on day of discharge:  Neurologically intact; baseline R eye blurred vision  R crani incision c/d/i with staples

## 2024-02-02 NOTE — DISCHARGE NOTE PROVIDER - NSDCFUADDINST_GEN_ALL_CORE_FT
Neurosurgery follow up appointment date/time:  - any sutures/staples in place will be removed at your follow up appointment  - please call the office to make/confirm appointment: [phone]    Wound Care:  - you may shower and get your incision wet; pat dry with clean towel after and keep incision open to air  - no picking at incision  - do not let your glasses touch the incision  -  Activity:  - fatigue is common after surgery, rest if you feel tired   - no bending, lifting, twisting or heavy lifting   - walking is recommended, ambulate as tolerated  - you may shower when you get home, keep your incision dry  - no soaking in a tub/pool/hot tub   - no driving within 24 hours of anesthesia or while taking prescription pain medications   - keep hydrated, drink plenty of water     Please also follow up with your primary care doctor.     Pain Expectations:  - pain after surgery is expected; please take pain meds as prescribed     Medications:  - Dexamethasone taper over 1 week after surgery for swelling  - Pantoprazole 40mg daily while on steroids to prevent stomach irritation  - Keppra 500mg twice a day for 1 week after surgery to prevent seizures  - Tylenol for mild pain as needed, Tramadol for severe pain as needed  - pain medications can cause constipation, you should eat a high fiber diet and may take a stool softener while on pain meds   - Avoid taking Advil (ibuprofen), Aleve (naproxen), or Aspirin for pain as they can cause bleeding     Call the office or come to ED if:  - wound has drainage or bleeding, increased redness or pain at incision site, neurological change, fever (>101), chills, night sweats, syncope, nausea/vomiting, chest pain, shortness of breath      Playback:  - A copy of your discharge instructions will be available on [employer]    WITHIN 24 HOURS OF DISCHARGE, PLEASE CONTACT NEURO PA  WITH ANY QUESTIONS OR CONCERNS: [phone]   OTHERWISE, PLEASE CALL THE OFFICE WITH ANY QUESTIONS OR CONCERNS: [phone] Neurosurgery follow up appointment date/time: 2/15/24 at 10:30 AM with NP EARNESTINE  - any sutures/staples in place will be removed at your follow up appointment  - please call the office to make/confirm appointment: [phone]    Wound Care:  - you may shower and get your incision wet; pat dry with clean towel after and keep incision open to air  - no picking at incision  - do not let your glasses touch the incision  -  Activity:  - fatigue is common after surgery, rest if you feel tired   - no bending, lifting, twisting or heavy lifting   - walking is recommended, ambulate as tolerated  - you may shower when you get home, keep your incision dry  - no soaking in a tub/pool/hot tub   - no driving within 24 hours of anesthesia or while taking prescription pain medications   - keep hydrated, drink plenty of water     Please also follow up with your primary care doctor.     Pain Expectations:  - pain after surgery is expected; please take pain meds as prescribed     Medications:  - Dexamethasone taper over 1 week after surgery for swelling  - Pantoprazole 40mg daily while on steroids to prevent stomach irritation  - Keppra 500mg twice a day for 1 week after surgery to prevent seizures  - Tylenol for mild pain as needed, Tramadol for severe pain as needed  - pain medications can cause constipation, you should eat a high fiber diet and may take a stool softener while on pain meds   - Avoid taking Advil (ibuprofen), Aleve (naproxen), or Aspirin for pain as they can cause bleeding     Call the office or come to ED if:  - wound has drainage or bleeding, increased redness or pain at incision site, neurological change, fever (>101), chills, night sweats, syncope, nausea/vomiting, chest pain, shortness of breath      Playback:  - A copy of your discharge instructions will be available on [employer]    WITHIN 24 HOURS OF DISCHARGE, PLEASE CONTACT NEURO PA  WITH ANY QUESTIONS OR CONCERNS: [phone]   OTHERWISE, PLEASE CALL THE OFFICE WITH ANY QUESTIONS OR CONCERNS: [phone] Neurosurgery follow up appointment date/time: 2/15/24 at 10:30 AM with NP EARNESTINE  - any sutures/staples in place will be removed at your follow up appointment  - please call the office to make/confirm appointment: [phone]    Wound Care:  - you may shower and get your incision wet; pat dry with clean towel after and keep incision open to air  - no picking at incision  - do not let your glasses touch the incision    Activity:  - fatigue is common after surgery, rest if you feel tired   - no bending, lifting, twisting or heavy lifting   - walking is recommended, ambulate as tolerated  - you may shower when you get home, keep your incision dry  - no soaking in a tub/pool/hot tub   - no driving within 24 hours of anesthesia or while taking prescription pain medications   - keep hydrated, drink plenty of water     Please also follow up with your primary care doctor.     Pain Expectations:  - pain after surgery is expected; please take pain meds as prescribed     Medications:  - Dexamethasone taper over 4 days for swelling  - Pantoprazole 40mg daily while on steroids to prevent stomach irritation  - Keppra 500mg twice a day for 4 more days to prevent seizures  - Tylenol for mild pain as needed, Tramadol for severe pain as needed  - pain medications can cause constipation, you should eat a high fiber diet and may take a stool softener while on pain meds   - Avoid taking Advil (ibuprofen), Aleve (naproxen), or Aspirin for pain as they can cause bleeding     Call the office or come to ED if:  - wound has drainage or bleeding, increased redness or pain at incision site, neurological change, fever (>101), chills, night sweats, syncope, nausea/vomiting, chest pain, shortness of breath      Playback:  - A copy of your discharge instructions will be available on [employer]    WITHIN 24 HOURS OF DISCHARGE, PLEASE CONTACT NEURO PA WITH ANY QUESTIONS OR CONCERNS: [phone]   OTHERWISE, PLEASE CALL THE OFFICE WITH ANY QUESTIONS OR CONCERNS: [phone]

## 2024-02-02 NOTE — DISCHARGE NOTE PROVIDER - NSDCFUSCHEDAPPT_GEN_ALL_CORE_FT
Jaqueline Lopez Physician [facility]  NEUROSURG [address] [facility] [address] [facility] [address]  Scheduled Appointment: 02/15/2024

## 2024-02-02 NOTE — CONSULT NOTE ADULT - SUBJECTIVE AND OBJECTIVE BOX
HPI: 58 YOF with PMH of HLD, thyroid nodule, shingles c/b postherpetic neuralgia admitted for elective meningioma resection s/p OR with Dr. Booker on 1/30/24 for R pterional craniotomy for resection of meningioma involving carotid/M1/optic. Patient feels well overall, reports still with frontal 3/10 headache but improved from before. Denies fever, vision changes, hearing changes, rhinorrhea, sore throat, chest pain, palpitations, cough, SOB, abd pain, N/V/D, dysuria, hematuria, rash, numbness/weakness/tingling, LOC.    ROS: negative except as per HPI    PMH: as per HPI  PSH: as per HPI  Medications: reviewed  Allergies: reviewed  SH:  FH:    Diet, Regular (01-31-24 @ 00:10) [Active]      MEDICATIONS:  MEDICATIONS  (STANDING):  dexAMETHasone     Tablet   Oral   dexAMETHasone     Tablet 4 milliGRAM(s) Oral every 8 hours  DULoxetine 30 milliGRAM(s) Oral at bedtime  DULoxetine 60 milliGRAM(s) Oral daily  enoxaparin Injectable 40 milliGRAM(s) SubCutaneous <User Schedule>  influenza   Vaccine 0.5 milliLiter(s) IntraMuscular once  insulin lispro (ADMELOG) corrective regimen sliding scale   SubCutaneous Before meals and at bedtime  levETIRAcetam 500 milliGRAM(s) Oral every 12 hours  pantoprazole    Tablet 40 milliGRAM(s) Oral before breakfast  polyethylene glycol 3350 17 Gram(s) Oral daily  senna 2 Tablet(s) Oral at bedtime    MEDICATIONS  (PRN):  acetaminophen     Tablet .. 650 milliGRAM(s) Oral every 6 hours PRN Mild Pain (1 - 3)  traMADol 50 milliGRAM(s) Oral every 4 hours PRN Severe Pain (7 - 10)  traMADol 25 milliGRAM(s) Oral every 4 hours PRN Moderate Pain (4 - 6)      Allergies    iv dye (Hives; Urticaria)  penicillin (Angioedema)  Seafood (Hives; Rash)  erythromycin (Hives)  ibuprofen (Angioedema)    Intolerances        OBJECTIVE:  Vital Signs Last 24 Hrs  T(C): 36.7 (02 Feb 2024 08:41), Max: 37 (01 Feb 2024 21:00)  T(F): 98.1 (02 Feb 2024 08:41), Max: 98.6 (01 Feb 2024 21:00)  HR: 61 (02 Feb 2024 11:21) (58 - 77)  BP: 121/71 (02 Feb 2024 11:21) (111/64 - 138/82)  BP(mean): 101 (01 Feb 2024 18:00) (97 - 105)  RR: 18 (02 Feb 2024 11:21) (15 - 18)  SpO2: 98% (02 Feb 2024 11:21) (95% - 98%)    Parameters below as of 02 Feb 2024 11:21  Patient On (Oxygen Delivery Method): room air      I&O's Summary    01 Feb 2024 07:01  -  02 Feb 2024 07:00  --------------------------------------------------------  IN: 500 mL / OUT: 580 mL / NET: -80 mL        PHYSICAL EXAM:  Gen: appears stated age, resting comfortably, NAD  HEENT: R crani incision, drain in place, MMM  Neck: supple  CV: RRR, no m/r/g, peripheral pulses 2+  Pulm: CTAB, no increased work of breathing, no rales/rhonchi  Abd: soft, ND, NT, no rebound or guarding  Skin: warm and dry  Ext: non-tender, no edema  Neuro: AOx3, speaking in full sentences  Psych: affect and behavior appropriate, pleasant at time of interview    LABS:                        10.7   10.45 )-----------( 181      ( 02 Feb 2024 05:30 )             33.0     02-02    139  |  104  |  23  ----------------------------<  116<H>  4.1   |  28  |  0.74    Ca    8.8      02 Feb 2024 05:30  Phos  3.5     02-02  Mg     2.2     02-02          CAPILLARY BLOOD GLUCOSE      POCT Blood Glucose.: 131 mg/dL (02 Feb 2024 11:48)  POCT Blood Glucose.: 119 mg/dL (02 Feb 2024 07:25)  POCT Blood Glucose.: 120 mg/dL (01 Feb 2024 22:19)  POCT Blood Glucose.: 122 mg/dL (01 Feb 2024 17:21)    Urinalysis Basic - ( 02 Feb 2024 05:30 )    Color: x / Appearance: x / SG: x / pH: x  Gluc: 116 mg/dL / Ketone: x  / Bili: x / Urobili: x   Blood: x / Protein: x / Nitrite: x   Leuk Esterase: x / RBC: x / WBC x   Sq Epi: x / Non Sq Epi: x / Bacteria: x        MICRODATA:      RADIOLOGY/OTHER STUDIES:

## 2024-02-02 NOTE — CONSULT NOTE ADULT - ASSESSMENT
58 YOF with PMH of HLD, thyroid nodule, shingles c/b postherpetic neuralgia admitted for elective meningioma resection s/p OR with Dr. Booker on 1/30/24 for R pterional craniotomy for resection of meningioma involving carotid/M1/optic.     Meningioma s/p resection  Post operative state  - management per NSGY  - s/p OR with Dr. Booker on 1/30/24  - one drain in place pending post-op MRI  - on steroid taper (+PPI and ISS)  - on levetiracetam 500mg BID for seizure ppx   - pain control with bowel regimen  - frequent perioperative incentive spirometer use  - early ambulation and OOBTC as tolerated  - chemical DVT ppx with LMWH     Leukocytosis  - afebrile, no s/s infection  - likely reactive 2/2 surgery and/or steroids  - resolved off antibiotics    Steroid induced hyperglycemia  - cont ISS with FSG ACHS while on steroid taper     Shingles c/b postherpetic neuralgia   - cont home duloxetine 90mg     HLD  - reports has not started stating therapy yet   - plans to follow up with PCP post op to initiate    Dispo: pending drain removal, recommend d/c tele monitoring    Recommendations and plan discussed with primary team.

## 2024-02-02 NOTE — DISCHARGE NOTE PROVIDER - NSDCCPCAREPLAN_GEN_ALL_CORE_FT
PRINCIPAL DISCHARGE DIAGNOSIS  Diagnosis: Meningioma  Assessment and Plan of Treatment: s/p R craniotomy for resection 1/30

## 2024-02-02 NOTE — PROGRESS NOTE ADULT - SUBJECTIVE AND OBJECTIVE BOX
HPI:  Taken from outpatient neurosurgery note on 23 " 58 year old female initially presented with chronic headache and right eye visual disturbances as well as intermittent tingling of the left arm and leg. She was sent for MRI by neurology and found to have a middle cranial fossa mass consistent with meningioma.     She was evaluated by Dr. Menon.    She saw Dr. Wallace and Dr. Abel last week. She had a normal optho exam.  "    57 y/o female with PMHx Hyperlipidemia, Thyroid Nodule, Shingles, Postherpetic neuralgia on cymbalta, presents for meningioma surgery today.  She reports continued occasional floaters in her vision and right eye blurriness.  She saw opthalmology for follow up which showed epiretinal membrane and post vitreous detachment in both eyes, but no compressive neuropathy or field cuts.  She denies other complaints. (2024 08:10)      Subjective:  Pain controlled.     Hospital course:  : POD 0. Post op slow to wake up, CTH performed. Shows small right SDH.  : POD 1. STACIA overnight. Neurologically stable throughout the day, SD status. Granda removed, passed TOV.  : POD 2. STACIA overnight. Neuro stable.   2: POD 3. STACIA overnight, neuro stable.   3: POD4, STACIA overnight, neuro stable    Vital Signs Last 24 Hrs  T(C): 36.8 (2024 00:28), Max: 37 (2024 21:00)  T(F): 98.3 (2024 00:28), Max: 98.6 (2024 21:00)  HR: 58 (2024 00:28) (56 - 107)  BP: 111/64 (2024 00:28) (101/56 - 138/82)  BP(mean): 101 (2024 18:00) (75 - 105)  RR: 16 (2024 00:28) (15 - 16)  SpO2: 95% (2024 00:28) (95% - 98%)    Parameters below as of 2024 00:28  Patient On (Oxygen Delivery Method): room air        I&O's Summary    2024 07:01  -  2024 07:00  --------------------------------------------------------  IN: 1285 mL / OUT: 671 mL / NET: 614 mL    2024 07:01  -  2024 02:53  --------------------------------------------------------  IN: 500 mL / OUT: 80 mL / NET: 420 mL        PHYSICAL EXAM:  General: patient seen laying supine in bed in NAD  Neuro: AAOx3, follows commands, opens eyes spontaneously, speech clear and fluent, CNII-XI grossly intact, face symmetric, no pronator drift, strength 5/5 b/l upper extremities and lower extremities, sensation intact to light touch throughout  HEENT: PERRL, EOMI, VFs intact b/l  Neck: supple  Cardiac: RRR, S1S2  Pulmonary: chest rise symmetric  Abdomen: soft, nontender, nondistended  Ext: perfusing well  Skin: warm, dry  Wound: R crani incision c/d/i, LENI x 1          DIET:  [] NPO  [x] Mechanical  [] Tube feeds    LABS:                        10.2   13.03 )-----------( 194      ( 2024 05:20 )             31.5     02-01    138  |  106  |  19  ----------------------------<  148<H>  4.4   |  27  |  0.79    Ca    8.8      2024 05:20  Phos  3.6     02-01  Mg     2.4     02-        Urinalysis Basic - ( 2024 05:20 )    Color: x / Appearance: x / SG: x / pH: x  Gluc: 148 mg/dL / Ketone: x  / Bili: x / Urobili: x   Blood: x / Protein: x / Nitrite: x   Leuk Esterase: x / RBC: x / WBC x   Sq Epi: x / Non Sq Epi: x / Bacteria: x          CAPILLARY BLOOD GLUCOSE      POCT Blood Glucose.: 120 mg/dL (2024 22:19)  POCT Blood Glucose.: 122 mg/dL (2024 17:21)  POCT Blood Glucose.: 169 mg/dL (2024 11:31)  POCT Blood Glucose.: 137 mg/dL (2024 06:35)      Drug Levels: [] N/A    CSF Analysis: [] N/A      Allergies    iv dye (Hives; Urticaria)  penicillin (Angioedema)  Seafood (Hives; Rash)  erythromycin (Hives)  ibuprofen (Angioedema)    Intolerances      MEDICATIONS:  Antibiotics:    Neuro:  DULoxetine 30 milliGRAM(s) Oral at bedtime  DULoxetine 60 milliGRAM(s) Oral daily  levETIRAcetam 500 milliGRAM(s) Oral every 12 hours  traMADol 50 milliGRAM(s) Oral every 4 hours PRN  traMADol 25 milliGRAM(s) Oral every 4 hours PRN    Anticoagulation:  enoxaparin Injectable 40 milliGRAM(s) SubCutaneous <User Schedule>    OTHER:  dexAMETHasone     Tablet   Oral   dexAMETHasone     Tablet 4 milliGRAM(s) Oral every 8 hours  influenza   Vaccine 0.5 milliLiter(s) IntraMuscular once  insulin lispro (ADMELOG) corrective regimen sliding scale   SubCutaneous Before meals and at bedtime  pantoprazole    Tablet 40 milliGRAM(s) Oral before breakfast  polyethylene glycol 3350 17 Gram(s) Oral daily  senna 2 Tablet(s) Oral at bedtime    IVF:    CULTURES:    RADIOLOGY & ADDITIONAL TESTS:    C71.9    No pertinent family history in first degree relatives    Handoff    MEWS Score    No pertinent past medical history    Hyperlipidemia    Thyroid goiter    Thyroid nodule    Anxiety    Cardiac arrhythmia    HLD (hyperlipidemia)    Meningioma    Post herpetic neuralgia    Shingles    Meningioma    Meningioma    Right craniotomy for tumor    H/O laparoscopy    Delivered by  section    H/O umbilical hernia repair    S/P biopsy    H/O hernia repair    H/O colonoscopy    [email or web address]        ASSESSMENT:  57 y/o female with PMHx HLD, Thyroid Nodule, postherpetic neuralgia, presented with LUE/LLE numbness (likely unrelated) and R eye blurry vision. 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 Now, s/p R pterional craniotomy for resection of b/l meningioma, frozen: meningioma 24.     Neuro:   - neuro/vitals q4h  - pain control: cranial eras, tramadol prn  - pending post op MRI  - decadron 4q6 taper to off over a week   - keppra 500 BID x7 days   - c/w duloxetine 60mg qAM, 30mg qhs   - CTH : small right SDH  - Subgaleal LENI drain x1, monitor output     Cardio:   - SBP <140    Pulm:   - RA, IS    GI:   - regular diet  - Bowel regimen  - PPI while on steroids     Renal:   - Na goal 135-145  - IVL  - voiding    Endo:   - ISS   - A1C 5.7    Heme:   - SCDs, SQL for DVT ppx    ID:   - afebrile    Dispo: SDU status, full code, PT/OT rec home no needs  D/w Dr. Booker     Assessment:  Present when checked    []  GCS  E   V  M     Heart Failure: []Acute, [] acute on chronic , []chronic  Heart Failure:  [] Diastolic (HFpEF), [] Systolic (HFrEF), []Combined (HFpEF and HFrEF), [] RHF, [] Pulm HTN, [] Other    [] LINDA, [] ATN, [] AIN, [] other  [] CKD1, [] CKD2, [] CKD 3, [] CKD 4, [] CKD 5, []ESRD    Encephalopathy: [] Metabolic, [] Hepatic, [] toxic, [] Neurological, [] Other    Abnormal Nurtitional Status: [] malnurtition (see nutrition note), [ ]underweight: BMI < 19, [] morbid obesity: BMI >40, [] Cachexia    [] Sepsis  [] hypovolemic shock,[] cardiogenic shock, [] hemorrhagic shock, [] neuogenic shock  [] Acute Respiratory Failure  []Cerebral edema, [] Brain compression/ herniation,   [] Functional quadriplegia  [] Acute blood loss anemia

## 2024-02-03 ENCOUNTER — TRANSCRIPTION ENCOUNTER (OUTPATIENT)
Age: 59
End: 2024-02-03

## 2024-02-03 VITALS
OXYGEN SATURATION: 94 % | SYSTOLIC BLOOD PRESSURE: 112 MMHG | DIASTOLIC BLOOD PRESSURE: 71 MMHG | RESPIRATION RATE: 16 BRPM | HEART RATE: 82 BPM | TEMPERATURE: 98 F

## 2024-02-03 LAB — GLUCOSE BLDC GLUCOMTR-MCNC: 121 MG/DL — HIGH (ref 70–99)

## 2024-02-03 PROCEDURE — 70450 CT HEAD/BRAIN W/O DYE: CPT

## 2024-02-03 PROCEDURE — A9585: CPT

## 2024-02-03 PROCEDURE — 80053 COMPREHEN METABOLIC PANEL: CPT

## 2024-02-03 PROCEDURE — 85610 PROTHROMBIN TIME: CPT

## 2024-02-03 PROCEDURE — 82962 GLUCOSE BLOOD TEST: CPT

## 2024-02-03 PROCEDURE — C1713: CPT

## 2024-02-03 PROCEDURE — 97116 GAIT TRAINING THERAPY: CPT

## 2024-02-03 PROCEDURE — 82803 BLOOD GASES ANY COMBINATION: CPT

## 2024-02-03 PROCEDURE — 88307 TISSUE EXAM BY PATHOLOGIST: CPT

## 2024-02-03 PROCEDURE — 88341 IMHCHEM/IMCYTCHM EA ADD ANTB: CPT

## 2024-02-03 PROCEDURE — 88342 IMHCHEM/IMCYTCHM 1ST ANTB: CPT

## 2024-02-03 PROCEDURE — 99232 SBSQ HOSP IP/OBS MODERATE 35: CPT

## 2024-02-03 PROCEDURE — 88360 TUMOR IMMUNOHISTOCHEM/MANUAL: CPT

## 2024-02-03 PROCEDURE — 83036 HEMOGLOBIN GLYCOSYLATED A1C: CPT

## 2024-02-03 PROCEDURE — 83735 ASSAY OF MAGNESIUM: CPT

## 2024-02-03 PROCEDURE — 70552 MRI BRAIN STEM W/DYE: CPT

## 2024-02-03 PROCEDURE — 86901 BLOOD TYPING SEROLOGIC RH(D): CPT

## 2024-02-03 PROCEDURE — 83605 ASSAY OF LACTIC ACID: CPT

## 2024-02-03 PROCEDURE — 97165 OT EVAL LOW COMPLEX 30 MIN: CPT

## 2024-02-03 PROCEDURE — 88331 PATH CONSLTJ SURG 1 BLK 1SPC: CPT

## 2024-02-03 PROCEDURE — 84100 ASSAY OF PHOSPHORUS: CPT

## 2024-02-03 PROCEDURE — C1889: CPT

## 2024-02-03 PROCEDURE — 99024 POSTOP FOLLOW-UP VISIT: CPT

## 2024-02-03 PROCEDURE — 85730 THROMBOPLASTIN TIME PARTIAL: CPT

## 2024-02-03 PROCEDURE — 36415 COLL VENOUS BLD VENIPUNCTURE: CPT

## 2024-02-03 PROCEDURE — 86900 BLOOD TYPING SEROLOGIC ABO: CPT

## 2024-02-03 PROCEDURE — 80048 BASIC METABOLIC PNL TOTAL CA: CPT

## 2024-02-03 PROCEDURE — 88333 PATH CONSLTJ SURG CYTO XM 1: CPT

## 2024-02-03 PROCEDURE — 97162 PT EVAL MOD COMPLEX 30 MIN: CPT

## 2024-02-03 PROCEDURE — 85027 COMPLETE CBC AUTOMATED: CPT

## 2024-02-03 PROCEDURE — 70553 MRI BRAIN STEM W/O & W/DYE: CPT

## 2024-02-03 PROCEDURE — 86923 COMPATIBILITY TEST ELECTRIC: CPT

## 2024-02-03 PROCEDURE — 86850 RBC ANTIBODY SCREEN: CPT

## 2024-02-03 RX ORDER — LACTULOSE 10 G/15ML
20 SOLUTION ORAL ONCE
Refills: 0 | Status: COMPLETED | OUTPATIENT
Start: 2024-02-03 | End: 2024-02-03

## 2024-02-03 RX ORDER — PANTOPRAZOLE SODIUM 20 MG/1
1 TABLET, DELAYED RELEASE ORAL
Qty: 4 | Refills: 0
Start: 2024-02-03 | End: 2024-02-06

## 2024-02-03 RX ORDER — DEXAMETHASONE 0.5 MG/5ML
2 ELIXIR ORAL
Qty: 12 | Refills: 0
Start: 2024-02-03 | End: 2024-02-06

## 2024-02-03 RX ORDER — TRAMADOL HYDROCHLORIDE 50 MG/1
1 TABLET ORAL
Qty: 24 | Refills: 0
Start: 2024-02-03 | End: 2024-02-06

## 2024-02-03 RX ORDER — LEVETIRACETAM 250 MG/1
1 TABLET, FILM COATED ORAL
Qty: 8 | Refills: 0
Start: 2024-02-03 | End: 2024-02-06

## 2024-02-03 RX ORDER — POLYETHYLENE GLYCOL 3350 17 G/17G
17 POWDER, FOR SOLUTION ORAL
Qty: 0 | Refills: 0 | DISCHARGE
Start: 2024-02-03

## 2024-02-03 RX ADMIN — PANTOPRAZOLE SODIUM 40 MILLIGRAM(S): 20 TABLET, DELAYED RELEASE ORAL at 05:37

## 2024-02-03 RX ADMIN — Medication 4 MILLIGRAM(S): at 12:00

## 2024-02-03 RX ADMIN — LACTULOSE 20 GRAM(S): 10 SOLUTION ORAL at 05:37

## 2024-02-03 RX ADMIN — TRAMADOL HYDROCHLORIDE 25 MILLIGRAM(S): 50 TABLET ORAL at 05:36

## 2024-02-03 RX ADMIN — TRAMADOL HYDROCHLORIDE 25 MILLIGRAM(S): 50 TABLET ORAL at 06:36

## 2024-02-03 RX ADMIN — POLYETHYLENE GLYCOL 3350 17 GRAM(S): 17 POWDER, FOR SOLUTION ORAL at 12:00

## 2024-02-03 RX ADMIN — LEVETIRACETAM 500 MILLIGRAM(S): 250 TABLET, FILM COATED ORAL at 05:37

## 2024-02-03 RX ADMIN — DULOXETINE HYDROCHLORIDE 60 MILLIGRAM(S): 30 CAPSULE, DELAYED RELEASE ORAL at 12:01

## 2024-02-03 NOTE — DISCHARGE NOTE NURSING/CASE MANAGEMENT/SOCIAL WORK - NSDCPEFALRISK_GEN_ALL_CORE
For information on Fall & Injury Prevention, visit: [email or web address] OR  [email or web address] OR  https://www.cdc.gov/steadi/patient.html

## 2024-02-03 NOTE — DISCHARGE NOTE NURSING/CASE MANAGEMENT/SOCIAL WORK - PATIENT PORTAL LINK FT
You can access the FollowMyHealth Patient Portal offered by [facility] by registering at the following website: [email or web address]. By joining [facility]’s FollowMyHealth portal, you will also be able to view your health information using other applications (apps) compatible with our system.

## 2024-02-03 NOTE — DISCHARGE NOTE NURSING/CASE MANAGEMENT/SOCIAL WORK - NSDCFUADDAPPT_GEN_ALL_CORE_FT
Please follow up with Dr. Booker / MARGOTH Parsons on 2/15 at 10:30am    Follow up with your primary care doctor

## 2024-02-03 NOTE — PROGRESS NOTE ADULT - PROVIDER SPECIALTY LIST ADULT
Hospitalist
Neurosurgery
NSICU
Neurosurgery
Neurosurgery
NSICU
no

## 2024-02-03 NOTE — PROGRESS NOTE ADULT - SUBJECTIVE AND OBJECTIVE BOX
Patient was seen and examined at bedside. Case discuss with primary team. Pt s/p drain removal this morning. Pt with some mild headache this morning however the pt reports that it is improved.     OBJECTIVE:  Vital Signs Last 24 Hrs  T(C): 36.7 (03 Feb 2024 08:40), Max: 36.8 (02 Feb 2024 20:04)  T(F): 98.1 (03 Feb 2024 08:40), Max: 98.2 (02 Feb 2024 20:04)  HR: 82 (03 Feb 2024 08:40) (61 - 82)  BP: 112/71 (03 Feb 2024 08:40) (107/68 - 143/83)  RR: 16 (03 Feb 2024 08:40) (16 - 18)  SpO2: 94% (03 Feb 2024 08:40) (94% - 96%)    Parameters below as of 03 Feb 2024 08:40  Patient On (Oxygen Delivery Method): room air    PHYSICAL EXAM:  Gen: NAD laying in bed; appears comfortable   CV: RRR, +S1/S2, no mumur  Pulm: CTA b/l no wheezing or crackles   Abd: soft, NTND + BS no rebound or guarding   Neuro: AAOx3 ; nonfocal                           10.7   10.45 )-----------( 181      ( 02 Feb 2024 05:30 )             33.0     139  |  104  |  23  ----------------------------<  116<H>  4.1   |  28  |  0.74    Ca    8.8      02 Feb 2024 05:30  Phos  3.5     02-02  Mg     2.2     02-02    CAPILLARY BLOOD GLUCOSE  POCT Blood Glucose.: 121 mg/dL (03 Feb 2024 06:26)  POCT Blood Glucose.: 136 mg/dL (02 Feb 2024 22:01)  POCT Blood Glucose.: 114 mg/dL (02 Feb 2024 18:27)      acetaminophen     Tablet .. 650 milliGRAM(s) Oral every 6 hours PRN  dexAMETHasone     Tablet   Oral   dexAMETHasone     Tablet 4 milliGRAM(s) Oral every 12 hours  DULoxetine 30 milliGRAM(s) Oral at bedtime  DULoxetine 60 milliGRAM(s) Oral daily  enoxaparin Injectable 40 milliGRAM(s) SubCutaneous <User Schedule>  influenza   Vaccine 0.5 milliLiter(s) IntraMuscular once  levETIRAcetam 500 milliGRAM(s) Oral every 12 hours  pantoprazole    Tablet 40 milliGRAM(s) Oral before breakfast  polyethylene glycol 3350 17 Gram(s) Oral daily  senna 2 Tablet(s) Oral at bedtime  traMADol 50 milliGRAM(s) Oral every 4 hours PRN  traMADol 25 milliGRAM(s) Oral every 4 hours PRN

## 2024-02-03 NOTE — PROGRESS NOTE ADULT - ASSESSMENT
58 YOF with PMH of HLD, thyroid nodule, shingles c/b postherpetic neuralgia admitted for elective meningioma resection s/p OR with Dr. Booker on 1/30/24 for R pterional craniotomy for resection of meningioma involving carotid/M1/optic.     #Meningioma s/p resection on 1/30/24  #Post operative state  -Pt s/p drain removal this morning.   - Pain and bowel regimen management per NSGY  -Pt s/p  post-op MRI last night on 2/2/2024.   -Continue steroid taper as well as PPI and ISS while on steroids as per NSGY team   -Continue levetiracetam 500mg BID for seizure ppx; Seizure precautions     #Shingles c/b postherpetic neuralgia   - Continue home duloxetine     #HLD  - Pt reports has not started stating therapy yet and the pt plans to follow up with PCP post op to initiate    #Dispo  - Pt is for discharge today as per primary team   
59 y/o female with PMHx HLD, Thyroid Nodule, postherpetic neuralgia, presented with LUE/LLE numbness (likely unrelated) and R eye blurry vision. 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 Now, s/p R pterional craniotomy for resection of b/l meningioma, frozen: meningioma 1/30/24.     Neuro:   - neuro/ vitals q1h  - post op MRI pending  - decadron 4q6 taper to off over a week   - keppra 500 BID x7 days   - c/w duloxetine 60mg qAM, 30mg qhs   - f/u cth  - pt/ot    Cardio:   - SBP     Pulm:   - RA, IS    GI:   - ADAT   - Bowel regimen  - PPI while on steroids     Renal:   - IVF while advancing diet     Endo:   - ISS     Heme:   - SCDs for DVT ppx  - hold chemoppx d/t fresh post op    ID:   - afebrile, no leukocytosis   
57 y/o female with PMHx HLD, Thyroid Nodule, postherpetic neuralgia, presented with LUE/LLE numbness (likely unrelated) and R eye blurry vision. 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 Now, s/p R pterional craniotomy for resection of b/l meningioma, frozen: meningioma 1/30/24.     Neuro:   - neuro/ vitals q4h  - post op MRI pending  - decadron 4q6 taper to off over a week   - keppra 500 BID x7 days   - c/w duloxetine 60mg qAM, 30mg qhs   - f/u cth  - pt/ot    Cardio:   - SBP     Pulm:   - RA, IS    GI:   - regular  - Bowel regimen  - PPI while on steroids     Renal:   - IVF     Endo:   - ISS     Heme:   - SCDs for DVT ppx  - hold chemoppx pending nsg clearance    ID:   - afebrile, no leukocytosis

## 2024-02-03 NOTE — PROCEDURE NOTE - ADDITIONAL PROCEDURE DETAILS
Site cleansed with chlorhexidine, anchoring stitch removed with suture scissors, drain placed off suction, drain pulled out without difficulty, staple placed at exit site. No residual bleeding noted.

## 2024-02-03 NOTE — PROGRESS NOTE ADULT - REASON FOR ADMISSION
meningioma

## 2024-02-05 ENCOUNTER — NON-APPOINTMENT (OUTPATIENT)
Age: 59
End: 2024-02-05

## 2024-02-08 NOTE — CHART NOTE - NSCHARTNOTEFT_GEN_A_CORE
1/30 CT head: There is an extra-axial fluid collection deep to craniotomy site containing air and hemorrhagic products.  These findings are expected postoperative changes and clinically not significant.

## 2024-02-13 DIAGNOSIS — Z91.041 RADIOGRAPHIC DYE ALLERGY STATUS: ICD-10-CM

## 2024-02-13 DIAGNOSIS — B02.29 OTHER POSTHERPETIC NERVOUS SYSTEM INVOLVEMENT: ICD-10-CM

## 2024-02-13 DIAGNOSIS — R73.9 HYPERGLYCEMIA, UNSPECIFIED: ICD-10-CM

## 2024-02-13 DIAGNOSIS — E78.5 HYPERLIPIDEMIA, UNSPECIFIED: ICD-10-CM

## 2024-02-13 DIAGNOSIS — H43.813 VITREOUS DEGENERATION, BILATERAL: ICD-10-CM

## 2024-02-13 DIAGNOSIS — D32.0 BENIGN NEOPLASM OF CEREBRAL MENINGES: ICD-10-CM

## 2024-02-13 DIAGNOSIS — B02.8 ZOSTER WITH OTHER COMPLICATIONS: ICD-10-CM

## 2024-02-13 DIAGNOSIS — E04.1 NONTOXIC SINGLE THYROID NODULE: ICD-10-CM

## 2024-02-13 DIAGNOSIS — T38.0X5A ADVERSE EFFECT OF GLUCOCORTICOIDS AND SYNTHETIC ANALOGUES, INITIAL ENCOUNTER: ICD-10-CM

## 2024-02-13 DIAGNOSIS — Z91.013 ALLERGY TO SEAFOOD: ICD-10-CM
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
## 2024-02-13 DIAGNOSIS — Z88.1 ALLERGY STATUS TO OTHER ANTIBIOTIC AGENTS STATUS: ICD-10-CM

## 2024-02-13 DIAGNOSIS — D72.829 ELEVATED WHITE BLOOD CELL COUNT, UNSPECIFIED: ICD-10-CM

## 2024-02-13 DIAGNOSIS — H47.091 OTHER DISORDERS OF OPTIC NERVE, NOT ELSEWHERE CLASSIFIED, RIGHT EYE: ICD-10-CM
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
## 2024-02-13 DIAGNOSIS — Z88.0 ALLERGY STATUS TO PENICILLIN: ICD-10-CM
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
## 2024-02-13 DIAGNOSIS — Z88.8 ALLERGY STATUS TO OTHER DRUGS, MEDICAMENTS AND BIOLOGICAL SUBSTANCES: ICD-10-CM

## 2024-02-13 PROBLEM — B02.9 ZOSTER WITHOUT COMPLICATIONS: Chronic | Status: ACTIVE | Noted: 2024-01-29

## 2024-02-15 ENCOUNTER — APPOINTMENT (OUTPATIENT)
Dept: NEUROSURGERY | Facility: CLINIC | Age: 59
End: 2024-02-15
Payer: COMMERCIAL

## 2024-02-15 VITALS
TEMPERATURE: 97.5 F | HEIGHT: 62 IN | OXYGEN SATURATION: 98 % | SYSTOLIC BLOOD PRESSURE: 112 MMHG | RESPIRATION RATE: 18 BRPM | DIASTOLIC BLOOD PRESSURE: 78 MMHG | HEART RATE: 109 BPM | WEIGHT: 150 LBS | BODY MASS INDEX: 27.6 KG/M2

## 2024-02-15 PROCEDURE — 99024 POSTOP FOLLOW-UP VISIT: CPT

## 2024-02-20 NOTE — ED ADULT NURSE REASSESSMENT NOTE - NS ED NURSE REASSESS COMMENT FT1
2024  EMPLOYEE INFORMATION: EMPLOYER INFORMATION:   NAME: Abel Gomez [facility] [employer]   : 1991 [phone]    DATE OF INJURY/EVENT: 2024           [facility]   Treating Provider: Shireen Mckee PA-C  Time In:  9:30 AM Time Out:  10:04 AM      DIAGNOSIS:   1. Strain of right shoulder, subsequent encounter    2. Contusion of right elbow, subsequent encounter    3. Fall on same level as cause of accidental injury      STATUS: This injury is determined to be WORK RELATED.    RETURN TO WORK:Employee may return to work with restrictions.   Return Date: 2024            RESTRICTIONS: Restrictions are to be followed at work and at home.  Restrictions are in effect until next follow-up visit.  No lifting, pushing or pulling over 15 lbs with the right arm or 30 lbs with both.  No reaching or working above shoulder height with the right arm.  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  Referral/Consult:   Physical Therapy: Continue        Diagnostic Testing: MRI SHOULDER RIGHT WO CONTRAST       Instructions: Alternate heat and ice/cold packs to painful areas 2-3 times daily for 20 minutes each.   Do not apply directly to the skin. Always use clothing or a light towel to protect the skin from injury or irritation.      NEXT RETURN VISIT:  after the MRI has been completed 2024 AT 9:30 AM   Thank you for the privilege of providing medical care for this injury/condition.  If there are any questions, please call the occupational health clinic at Dept: [phone].      Electronically signed on 2024 at 9:58 AM by:   Shireen Mckee PA-C   [facility]      
CT resulted. Waiting for dispo.
Contacted Ben at CT. Patient to be scanned at 1840 hrs. Patient will require premedication of IV benadryl at 1740hrs, 1 hr prior to scan.
Patient premedicated as per order. Resting on stretcher awaiting transport to CT for scan scheduled for 1840hrs.
Patient report received from TRAVIS Avila. Patient is a&o x3, resting on stretcher, in no acute distress at this time. Patient has known allergy to IV contrast dye, requires CT angio w/ contrast. MD aware, premedicated as per order. Patient is aware of plan of care.

## 2024-02-20 NOTE — ASSESSMENT
[FreeTextEntry1] : My impression is that the patient suffers from a nresected WHO grade I meningioma.  The patient was extensively educated about the nature of her disease process.   Incision healing well. All staples removed and incision cleansed with chlorhexidine solution. Patient and his family counseled to wash wound with soap and water daily, pat dry and keep open to air. They will call for any wound drainage, new headaches or fever over 100F. She will return via telehealth to see Dr. Booker in 3-4 weeks to monitor progress.

## 2024-02-20 NOTE — HISTORY OF PRESENT ILLNESS
[de-identified] : 58 year old female initially presented with chronic headache and right eye visual disturbances as well as intermittent tingling of the left arm and leg. She was sent for MRI by neurology and found to have a middle cranial fossa mass consistent with meningioma.   She was evaluated by Dr. Menon.  She saw Dr. Wallace and Dr. Abel last week. She had a normal optho exam. She is no longer on keppra or dexamethasone.   She presents today for routine postop visit.  Denies fever or wound drainage.

## 2024-02-20 NOTE — REASON FOR VISIT
[Spouse] : spouse [de-identified] : craniotomy for resection [de-identified] : 1/30/24 [de-identified] : PATH: WHO grade I meningioma, postive for [place], KI67 1-2%

## 2024-02-20 NOTE — PHYSICAL EXAM
[General Appearance - Alert] : alert [General Appearance - In No Acute Distress] : in no acute distress [Longitudinal] : longitudinal [Clean] : clean [Healing Well] : healing well [Intact] : intact [Staple Intact] : closed with intact staples [No Drainage] : without drainage [Normal Skin] : normal [Person] : oriented to person [Place] : oriented to place [Time] : oriented to time [Cranial Nerves Optic (II)] : visual acuity intact bilaterally,  pupils equal round and reactive to light [Cranial Nerves Oculomotor (III)] : extraocular motion intact [Cranial Nerves Trigeminal (V)] : facial sensation intact symmetrically [Cranial Nerves Facial (VII)] : face symmetrical [Cranial Nerves Vestibulocochlear (VIII)] : hearing was intact bilaterally [Cranial Nerves Glossopharyngeal (IX)] : tongue and palate midline [Cranial Nerves Accessory (XI - Cranial And Spinal)] : head turning and shoulder shrug symmetric [Motor Tone] : muscle tone was normal in all four extremities [Motor Strength] : muscle strength was normal in all four extremities [Sensation Tactile Decrease] : light touch was intact [Abnormal Walk] : normal gait [Balance] : balance was intact [PERRL With Normal Accommodation] : pupils were equal in size, round, reactive to light, with normal accommodation [Extraocular Movements] : extraocular movements were intact [Full Visual Field] : full visual field [FreeTextEntry1] : right frontal scalp

## 2024-03-15 ENCOUNTER — APPOINTMENT (OUTPATIENT)
Dept: NEUROSURGERY | Facility: CLINIC | Age: 59
End: 2024-03-15
Payer: COMMERCIAL

## 2024-03-15 PROCEDURE — 99213 OFFICE O/P EST LOW 20 MIN: CPT

## 2024-03-15 NOTE — PHYSICAL EXAM
[General Appearance - In No Acute Distress] : in no acute distress [General Appearance - Alert] : alert [Impaired Insight] : insight and judgment were intact [Oriented To Time, Place, And Person] : oriented to person, place, and time [Affect] : the affect was normal [Person] : oriented to person [Place] : oriented to place [Time] : oriented to time [Cranial Nerves Optic (II)] : visual acuity intact bilaterally,  pupils equal round and reactive to light [Cranial Nerves Oculomotor (III)] : extraocular motion intact [Cranial Nerves Vestibulocochlear (VIII)] : hearing was intact bilaterally [Cranial Nerves Facial (VII)] : face symmetrical [Sclera] : the sclera and conjunctiva were normal [Extraocular Movements] : extraocular movements were intact

## 2024-03-15 NOTE — REVIEW OF SYSTEMS
[Confused or Disoriented] : no confusion [Memory Lapses or Loss] : no memory loss [Facial Weakness] : no facial weakness [Arm Weakness] : no arm weakness [Hand Weakness] : no hand weakness [Leg Weakness] : no leg weakness

## 2024-03-15 NOTE — HISTORY OF PRESENT ILLNESS
[FreeTextEntry1] : 58 year old female initially presented with chronic headache and right eye visual disturbances as well as intermittent tingling of the left arm and leg. She was sent for MRI by neurology and found to have a middle cranial fossa mass consistent with meningioma.   She was evaluated by Dr. Menon.  She saw Dr. Wallace and Dr. Abel. She had a normal optho exam.    She presents today for routine postop visit.  Denies fever or wound drainage. Asking about hair growth which is slow, bending and lifting.

## 2024-03-15 NOTE — PROCEDURE
[FreeTextEntry1] : ACC: [id number] EXAM: MR BRAIN WAW IC ORDERED BY: KRISSY SUHAIL  PROCEDURE DATE: 02/02/2024    INTERPRETATION: CLINICAL INDICATION: Postop craniotomy for right paraclinoid meningioma   Magnetic resonance imaging of the brain was carried out with transaxial SPGR, FLAIR, fast spin echo T2 weighted images, axial susceptibility weighted series, diffusion weighted series and sagittal T1 weighted series on a 3.0 Tala magnet. Post contrast axial, coronal and sagittal T1 weighted images were obtained. 7 cc of Gadavist were intravenously injected, 0.5 cc were discarded.  Comparison is made with the prior MRI of 1/30/2024 and brain CT 1/30/2024 at 3:10 PM  There has been a right pterional craniotomy. Postoperative changes in the right frontal parenchyma are identified. The right paraclinoid extra-axial enhancing mass has been removed. There is a small amount of diffusion restriction in the right frontal and temporal parenchyma. There is no significant postoperative hemorrhage After contrast administration there is normal intracranial vascular enhancement.  Scattered small vessel white matter ischemic changes are noted. Ventricles are normal in size and position.  There is a partially empty sella.  IMPRESSION: After right frontal temporal craniotomy the right paraclinoid mass has been removed. There is a small amount of postoperative change with gliosis and diffusion restriction in the right frontal temporal parenchyma. No significant postoperative hemorrhage..

## 2024-03-15 NOTE — DATA REVIEWED
[de-identified] : I have reviewed the most recent MRI on 2/2/24  which shows small amount of post-operative changes with gliosis & diffusion restriction in the right front parenchyma.

## 2024-03-15 NOTE — ASSESSMENT
[FreeTextEntry1] :  My impression is that the patient suffers from a resected WHO grade I meningioma  .  I had a long discussion with the patient regarding the role of serial imaging.  The patient was extensively educated about the nature of her disease process. Therapeutic and diagnostic tests include 3months.  The patient should see [id number].  I will see the patient back in 3 months in person May. I have explained the alternatives, risks and benefits to the patient and she understands and agrees to proceed.

## 2024-03-15 NOTE — REASON FOR VISIT
[de-identified] :  is status post craniotomy for resection [de-identified] : 1/30/24 [de-identified] : PATH: WHO grade I meningioma, postive for [place], KI67 1-2%.

## 2024-04-29 ENCOUNTER — RESULT REVIEW (OUTPATIENT)
Age: 59
End: 2024-04-29

## 2024-04-29 ENCOUNTER — OUTPATIENT (OUTPATIENT)
Dept: OUTPATIENT SERVICES | Facility: HOSPITAL | Age: 59
LOS: 1 days | End: 2024-04-29
Payer: COMMERCIAL

## 2024-04-29 ENCOUNTER — APPOINTMENT (OUTPATIENT)
Dept: MRI IMAGING | Facility: HOSPITAL | Age: 59
End: 2024-04-29

## 2024-04-29 ENCOUNTER — APPOINTMENT (OUTPATIENT)
Dept: NEUROSURGERY | Facility: CLINIC | Age: 59
End: 2024-04-29
Payer: COMMERCIAL

## 2024-04-29 DIAGNOSIS — Z98.890 OTHER SPECIFIED POSTPROCEDURAL STATES: Chronic | ICD-10-CM

## 2024-04-29 DIAGNOSIS — Z98.890 OTHER SPECIFIED POSTPROCEDURAL STATES: ICD-10-CM

## 2024-04-29 DIAGNOSIS — Z86.018 OTHER SPECIFIED POSTPROCEDURAL STATES: ICD-10-CM

## 2024-04-29 PROCEDURE — 70553 MRI BRAIN STEM W/O & W/DYE: CPT

## 2024-04-29 PROCEDURE — 99213 OFFICE O/P EST LOW 20 MIN: CPT | Mod: 24

## 2024-04-29 PROCEDURE — A9585: CPT

## 2024-04-29 PROCEDURE — 70553 MRI BRAIN STEM W/O & W/DYE: CPT | Mod: 26

## 2024-04-29 NOTE — ASSESSMENT
[FreeTextEntry1] : The patients established problem of resected meningioma is stable.  I had a long discussion with the patient regarding the role of continued surveillance. The patient is okay to fly and dye her hair.  The patient was extensively educated about the nature of her disease process. Therapeutic and diagnostic tests include MRI w/wo IVC.  The patient should see MD Yves Sheikh to evaluate TMJ.  I will see the patient back in Feb 2025. I have explained the alternatives, risks and benefits to the patient andshe understands and agrees to proceed.

## 2024-04-29 NOTE — HISTORY OF PRESENT ILLNESS
[FreeTextEntry1] : 58 year old female initially presented with chronic headache and right eye visual disturbances as well as intermittent tingling of the left arm and leg. She was sent for MRI by neurology and found to have a middle cranial fossa mass consistent with meningioma.   She was evaluated by Dr. Menon.  She saw Dr. Wallace and Dr. Abel. She had a normal optho exam.    TODAY 4/29/24 the patient presents for review of MRI. Complaints of difficulty opening her mouth. Also reports some numbness and burning at incision site. No swelling erythema, fever/infection. Denies headache, changes in speech/vision, nausea/vomiting, numbness/tingling/weakness in extremities, abnormal movement of extremities.

## 2024-04-29 NOTE — DATA REVIEWED
[de-identified] : I have reviewed the most recent MRI on 4/29/24 at [facility] which shows ""

## 2024-07-30 NOTE — ED ADULT NURSE NOTE - CAS TRG GENERAL AIRWAY, MLM
Surgery by  7/25/24. Concerned about a BP lower than usual since his surgery date and some lightheadedness. Asking if he should continue his BP meds.     Please advise Should he make an appointment to be seen or continue to monitor his BP and take his medication?  Routed to    Patent

## 2025-02-07 ENCOUNTER — APPOINTMENT (OUTPATIENT)
Dept: MRI IMAGING | Facility: HOSPITAL | Age: 60
End: 2025-02-07

## 2025-02-07 ENCOUNTER — OUTPATIENT (OUTPATIENT)
Dept: OUTPATIENT SERVICES | Facility: HOSPITAL | Age: 60
LOS: 1 days | End: 2025-02-07
Payer: SELF-PAY

## 2025-02-07 ENCOUNTER — APPOINTMENT (OUTPATIENT)
Dept: NEUROSURGERY | Facility: CLINIC | Age: 60
End: 2025-02-07
Payer: SELF-PAY

## 2025-02-07 DIAGNOSIS — Z98.890 OTHER SPECIFIED POSTPROCEDURAL STATES: Chronic | ICD-10-CM

## 2025-02-07 DIAGNOSIS — D32.9 BENIGN NEOPLASM OF MENINGES, UNSPECIFIED: ICD-10-CM

## 2025-02-07 PROCEDURE — A9585: CPT

## 2025-02-07 PROCEDURE — 99213 OFFICE O/P EST LOW 20 MIN: CPT

## 2025-02-07 PROCEDURE — 70553 MRI BRAIN STEM W/O & W/DYE: CPT | Mod: 26

## 2025-02-07 PROCEDURE — 70553 MRI BRAIN STEM W/O & W/DYE: CPT

## (undated) DEVICE — Device

## (undated) DEVICE — MIDAS REX MR8 ACORN LG BORE 7.5MM X 9CM

## (undated) DEVICE — ARACHNOID CIRCULAR SUPERFICIAL HANDLE 5"

## (undated) DEVICE — SUT ETHIBOND 3-0 36" RB-1

## (undated) DEVICE — MIDAS REX MR8 BALL FLUTED LG BORE 5MM X 9CM

## (undated) DEVICE — FRAZIER CONNECTING TUBE 2FT 5MM

## (undated) DEVICE — LONE STAR RETRACTOR RING 12MM BLUNT DISP

## (undated) DEVICE — TUBING SUCTION 20FT

## (undated) DEVICE — SUT SILK 5-0 18" P-3

## (undated) DEVICE — DRAIN RESERVOIR FOR JACKSON PRATT 100CC CARDINAL

## (undated) DEVICE — DRSG TELFA .5 X 3

## (undated) DEVICE — DRAPE MAYO STAND 30"

## (undated) DEVICE — SUT MONOCRYL 3-0 27" SH UNDYED

## (undated) DEVICE — PREP DURAPREP 26CC

## (undated) DEVICE — SUT VICRYL PLUS 2-0 18" CT-2 (POP-OFF)

## (undated) DEVICE — SUT VICRYL 3-0 27" SH UNDYED

## (undated) DEVICE — DRAPE INSTRUMENT POUCH 6.75" X 11"

## (undated) DEVICE — ELCTR COLORADO 3CM

## (undated) DEVICE — MARKING PEN W RULER

## (undated) DEVICE — ELCTR STRYKER NEPTUNE SMOKE EVACUATION PENCIL (GREEN)

## (undated) DEVICE — SUT PDS II 3-0 27" SH UNDYED

## (undated) DEVICE — BIPOLAR ISOCOOL TIP 2MM

## (undated) DEVICE — PREP BETADINE SPONGE STICKS

## (undated) DEVICE — BIPOLAR FORCEP GILLEN SURGICAL NXT TIP 7" X 0.4MM

## (undated) DEVICE — SUT NUROLON 4-0 8-18" TF (POP-OFF)

## (undated) DEVICE — RUBBERBAND STRL LTX FR 200/CA 3X1/8IN

## (undated) DEVICE — BIPOLAR FORCEP KIRWAN JEWELERS STR 4" X 0.4MM W 12FT CORD (GREEN)

## (undated) DEVICE — MIDAS REX MR8 ACORN LG BORE 6MM X 9CM

## (undated) DEVICE — SUT SILK 2-0 30" PSL

## (undated) DEVICE — SUT CHROMIC 5-0 18" P-3

## (undated) DEVICE — BLADE SURGICAL #15 CARBON

## (undated) DEVICE — SUT ETHILON 2-0 18" PS

## (undated) DEVICE — DRAPE TOWEL BLUE 17" X 24"

## (undated) DEVICE — LAP PAD 18 X 18"

## (undated) DEVICE — PACK CRANIOTOMY LNX SURGICOUNT

## (undated) DEVICE — CODMAN PERFORATOR 14MM (BLUE)

## (undated) DEVICE — STRYKER INSTRUMENT BATTERY

## (undated) DEVICE — PREP BETADINE KIT

## (undated) DEVICE — TUBING STRYKER SET AND EXTENDER FILTER TUBING

## (undated) DEVICE — DRAPE SURGICAL #1010

## (undated) DEVICE — MIDAS REX MR8 BALL DIAMOND SM BORE 2MM X 10CM

## (undated) DEVICE — SUT VICRYL PLUS 2-0 18" CT-1 (POP-OFF)

## (undated) DEVICE — VENODYNE/SCD SLEEVE CALF MEDIUM

## (undated) DEVICE — STAPLER SKIN PROXIMATE

## (undated) DEVICE — BIPOLAR FORCEP KOGENT IRRIGATING STRAIGHT 0.5MM X 7" DISP

## (undated) DEVICE — SUT ETHILON 3-0 18" PS-1

## (undated) DEVICE — MIDAS REX LEGEND TAPERED SM BORE 2.3MM X 8CM

## (undated) DEVICE — AESCULAP SCALPFIX 10 CLIPS

## (undated) DEVICE — STRYKER SONOPET TIP UNIVERSAL STRAIGHT